# Patient Record
Sex: FEMALE | Race: BLACK OR AFRICAN AMERICAN | NOT HISPANIC OR LATINO | URBAN - METROPOLITAN AREA
[De-identification: names, ages, dates, MRNs, and addresses within clinical notes are randomized per-mention and may not be internally consistent; named-entity substitution may affect disease eponyms.]

---

## 2017-06-23 ENCOUNTER — EMERGENCY (EMERGENCY)
Facility: HOSPITAL | Age: 27
LOS: 0 days | Discharge: HOME | End: 2017-06-24
Attending: EMERGENCY MEDICINE
Payer: SELF-PAY

## 2017-06-23 VITALS
HEIGHT: 68 IN | DIASTOLIC BLOOD PRESSURE: 71 MMHG | OXYGEN SATURATION: 100 % | SYSTOLIC BLOOD PRESSURE: 118 MMHG | HEART RATE: 63 BPM | RESPIRATION RATE: 17 BRPM | TEMPERATURE: 98 F | WEIGHT: 179.9 LBS

## 2017-06-23 DIAGNOSIS — M79.671 PAIN IN RIGHT FOOT: ICD-10-CM

## 2017-06-23 DIAGNOSIS — M72.2 PLANTAR FASCIAL FIBROMATOSIS: ICD-10-CM

## 2017-06-23 PROCEDURE — 99283 EMERGENCY DEPT VISIT LOW MDM: CPT | Mod: 25

## 2017-06-23 PROCEDURE — 99053 MED SERV 10PM-8AM 24 HR FAC: CPT

## 2017-06-23 NOTE — ED ADULT TRIAGE NOTE - CHIEF COMPLAINT QUOTE
pt c/o R-foot pain since Sunday.  denies injury/trauma pt c/o R-foot pain since Sunday.  denies injury/trauma.  LMP 6/19/17

## 2017-06-24 VITALS
HEART RATE: 60 BPM | DIASTOLIC BLOOD PRESSURE: 78 MMHG | TEMPERATURE: 98 F | SYSTOLIC BLOOD PRESSURE: 111 MMHG | OXYGEN SATURATION: 98 % | RESPIRATION RATE: 16 BRPM

## 2017-06-24 PROCEDURE — 73630 X-RAY EXAM OF FOOT: CPT | Mod: 26,RT

## 2017-06-24 NOTE — ED PROVIDER NOTE - PHYSICAL EXAMINATION
GEN: Alert, NAD  HEAD: atraumatic, EOMI, PERRL, normal lids/conjunctiva  ENT: normal hearing, patent oropharynx without erythema/exudate, uvula midline  NECK: +supple, no tenderness/meningismus, +Trachea midline  PULM: Bilateral BS, normal resp effort, no wheeze/stridor/retractions  CV: RRR, no M/R/G, +dist ext pulses  ABD: soft, NT/ND  BACK: no CVAT, no midline tenderness  MSK: no edema/erythema/cyanosis  SKIN: no rash  NEURO: AAOx3, no sensory/motor deficits, CN 2-12 intact  R foot - mild tender along plantar fascia and dorsal metatarsal area near 2nd toe, no marked swelling, pain worse w flexio nof the toes. can dorsi/plantar flex foot

## 2017-06-24 NOTE — ED PROVIDER NOTE - PRESENTING SYMPTOMS DETAILS
26F no pmhx/shx coemsin w R foot pain .. started a few days ago while getting out of the car, does a lot of steps at home, no falls/injuries. pain at bottom of her ft worse w flexion and also midly on top, no kneepain  ROS: No fever/chills, No photophobia/eye pain/changes in vision, No ear pain/sore throat/dysphagia, No chest pain/palpitations, no SOB/cough/wheeze/stridor, No abdominal pain/N/V/D, no dysuria/frequency/discharge, No neck/back pain, no rash, no changes in neurological status/function.

## 2017-06-24 NOTE — ED PROVIDER NOTE - MEDICAL DECISION MAKING DETAILS
Xrays demonstrate no acute pathology. possible plantar fasciits, no signs of infx. pt appears well and non-toxic. . VSS. Sx have improved during ED stay. No acute events during ED observation period. Precautions given to return to the ED if sx persist or change. Pt expresses understanding and has no further questions. Pt feels comfortable and wishes to be discharged home. Instructed to follow up with PMD in 24 hrs. ortho

## 2018-12-10 PROBLEM — Z00.00 ENCOUNTER FOR PREVENTIVE HEALTH EXAMINATION: Status: ACTIVE | Noted: 2018-12-10

## 2018-12-21 ENCOUNTER — APPOINTMENT (OUTPATIENT)
Dept: OBGYN | Facility: CLINIC | Age: 28
End: 2018-12-21
Payer: COMMERCIAL

## 2018-12-21 ENCOUNTER — ASOB RESULT (OUTPATIENT)
Age: 28
End: 2018-12-21

## 2018-12-21 ENCOUNTER — OTHER (OUTPATIENT)
Age: 28
End: 2018-12-21

## 2018-12-21 VITALS
DIASTOLIC BLOOD PRESSURE: 75 MMHG | HEART RATE: 77 BPM | SYSTOLIC BLOOD PRESSURE: 120 MMHG | BODY MASS INDEX: 30.89 KG/M2 | WEIGHT: 203.8 LBS | HEIGHT: 68 IN

## 2018-12-21 DIAGNOSIS — Z87.42 PERSONAL HISTORY OF OTHER DISEASES OF THE FEMALE GENITAL TRACT: ICD-10-CM

## 2018-12-21 DIAGNOSIS — Z83.49 FAMILY HISTORY OF OTHER ENDOCRINE, NUTRITIONAL AND METABOLIC DISEASES: ICD-10-CM

## 2018-12-21 DIAGNOSIS — Z83.3 FAMILY HISTORY OF DIABETES MELLITUS: ICD-10-CM

## 2018-12-21 DIAGNOSIS — Z82.49 FAMILY HISTORY OF ISCHEMIC HEART DISEASE AND OTHER DISEASES OF THE CIRCULATORY SYSTEM: ICD-10-CM

## 2018-12-21 DIAGNOSIS — Z87.59 PERSONAL HISTORY OF OTHER COMPLICATIONS OF PREGNANCY, CHILDBIRTH AND THE PUERPERIUM: ICD-10-CM

## 2018-12-21 PROCEDURE — 0500F INITIAL PRENATAL CARE VISIT: CPT

## 2018-12-21 PROCEDURE — 76817 TRANSVAGINAL US OBSTETRIC: CPT

## 2018-12-21 PROCEDURE — 90688 IIV4 VACCINE SPLT 0.5 ML IM: CPT

## 2018-12-21 PROCEDURE — 90471 IMMUNIZATION ADMIN: CPT

## 2018-12-28 ENCOUNTER — APPOINTMENT (OUTPATIENT)
Dept: OBGYN | Facility: CLINIC | Age: 28
End: 2018-12-28

## 2018-12-31 ENCOUNTER — APPOINTMENT (OUTPATIENT)
Dept: ANTEPARTUM | Facility: CLINIC | Age: 28
End: 2018-12-31
Payer: COMMERCIAL

## 2018-12-31 ENCOUNTER — ASOB RESULT (OUTPATIENT)
Age: 28
End: 2018-12-31

## 2018-12-31 PROCEDURE — 76813 OB US NUCHAL MEAS 1 GEST: CPT

## 2018-12-31 PROCEDURE — 76801 OB US < 14 WKS SINGLE FETUS: CPT

## 2018-12-31 PROCEDURE — 36416 COLLJ CAPILLARY BLOOD SPEC: CPT

## 2019-01-10 ENCOUNTER — APPOINTMENT (OUTPATIENT)
Dept: OBGYN | Facility: CLINIC | Age: 29
End: 2019-01-10
Payer: COMMERCIAL

## 2019-01-10 ENCOUNTER — NON-APPOINTMENT (OUTPATIENT)
Age: 29
End: 2019-01-10

## 2019-01-10 VITALS
DIASTOLIC BLOOD PRESSURE: 76 MMHG | BODY MASS INDEX: 31.42 KG/M2 | HEIGHT: 68 IN | WEIGHT: 207.3 LBS | SYSTOLIC BLOOD PRESSURE: 120 MMHG

## 2019-01-10 PROBLEM — Z82.49 FAMILY HISTORY OF HYPERTENSION: Status: ACTIVE | Noted: 2019-01-10

## 2019-01-10 PROBLEM — Z83.3 FAMILY HISTORY OF DIABETES MELLITUS: Status: ACTIVE | Noted: 2019-01-10

## 2019-01-10 PROBLEM — Z83.49 FAMILY HISTORY OF HYPOTHYROIDISM: Status: ACTIVE | Noted: 2019-01-10

## 2019-01-10 PROBLEM — Z87.42 HISTORY OF FEMALE INFERTILITY: Status: RESOLVED | Noted: 2019-01-10 | Resolved: 2019-01-10

## 2019-01-10 PROBLEM — Z87.59 HISTORY OF ECTOPIC PREGNANCY: Status: RESOLVED | Noted: 2019-01-10 | Resolved: 2019-01-10

## 2019-01-10 LAB
ABO + RH PNL BLD: NORMAL
B19V IGG SER QL IA: 2.7 INDEX
B19V IGG+IGM SER-IMP: NORMAL
B19V IGG+IGM SER-IMP: POSITIVE
B19V IGM FLD-ACNC: 0.1 INDEX
B19V IGM SER-ACNC: NEGATIVE
BACTERIA UR CULT: NORMAL
BASOPHILS # BLD AUTO: 0.03 K/UL
BASOPHILS NFR BLD AUTO: 0.3 %
BLD GP AB SCN SERPL QL: NORMAL
C TRACH RRNA SPEC QL NAA+PROBE: NOT DETECTED
EOSINOPHIL # BLD AUTO: 0.21 K/UL
EOSINOPHIL NFR BLD AUTO: 2.3 %
HBV SURFACE AG SER QL: NONREACTIVE
HCT VFR BLD CALC: 38.3 %
HCV AB SER QL: NONREACTIVE
HCV S/CO RATIO: 0.22 S/CO
HGB A MFR BLD: 97.7 %
HGB A2 MFR BLD: 2.3 %
HGB BLD-MCNC: 12.9 G/DL
HGB FRACT BLD-IMP: NORMAL
HIV1+2 AB SPEC QL IA.RAPID: NONREACTIVE
IMM GRANULOCYTES NFR BLD AUTO: 0.2 %
LEAD BLD-MCNC: 1 UG/DL
LYMPHOCYTES # BLD AUTO: 2.77 K/UL
LYMPHOCYTES NFR BLD AUTO: 30.2 %
MAN DIFF?: NORMAL
MCHC RBC-ENTMCNC: 27.9 PG
MCHC RBC-ENTMCNC: 33.7 GM/DL
MCV RBC AUTO: 82.7 FL
MONOCYTES # BLD AUTO: 0.77 K/UL
MONOCYTES NFR BLD AUTO: 8.4 %
N GONORRHOEA RRNA SPEC QL NAA+PROBE: NOT DETECTED
NEUTROPHILS # BLD AUTO: 5.36 K/UL
NEUTROPHILS NFR BLD AUTO: 58.6 %
PLATELET # BLD AUTO: 289 K/UL
RBC # BLD: 4.63 M/UL
RBC # FLD: 14 %
RUBV IGG FLD-ACNC: 4 INDEX
RUBV IGG SER-IMP: POSITIVE
SOURCE AMPLIFICATION: NORMAL
T GONDII AB SER-IMP: NEGATIVE
T GONDII AB SER-IMP: NEGATIVE
T GONDII IGG SER QL: <3 IU/ML
T GONDII IGM SER QL: <3 AU/ML
T PALLIDUM AB SER QL IA: NEGATIVE
VZV AB TITR SER: POSITIVE
VZV IGG SER IF-ACNC: 1967 INDEX
WBC # FLD AUTO: 9.16 K/UL

## 2019-01-10 PROCEDURE — 0502F SUBSEQUENT PRENATAL CARE: CPT

## 2019-02-06 ENCOUNTER — LABORATORY RESULT (OUTPATIENT)
Age: 29
End: 2019-02-06

## 2019-02-07 ENCOUNTER — NON-APPOINTMENT (OUTPATIENT)
Age: 29
End: 2019-02-07

## 2019-02-07 ENCOUNTER — APPOINTMENT (OUTPATIENT)
Dept: OBGYN | Facility: CLINIC | Age: 29
End: 2019-02-07
Payer: COMMERCIAL

## 2019-02-07 VITALS
WEIGHT: 208 LBS | DIASTOLIC BLOOD PRESSURE: 77 MMHG | BODY MASS INDEX: 31.52 KG/M2 | HEIGHT: 68 IN | SYSTOLIC BLOOD PRESSURE: 117 MMHG

## 2019-02-07 PROCEDURE — 0502F SUBSEQUENT PRENATAL CARE: CPT

## 2019-02-20 ENCOUNTER — OTHER (OUTPATIENT)
Age: 29
End: 2019-02-20

## 2019-02-20 ENCOUNTER — ASOB RESULT (OUTPATIENT)
Age: 29
End: 2019-02-20

## 2019-02-20 ENCOUNTER — APPOINTMENT (OUTPATIENT)
Dept: ANTEPARTUM | Facility: CLINIC | Age: 29
End: 2019-02-20
Payer: COMMERCIAL

## 2019-02-20 PROCEDURE — 76811 OB US DETAILED SNGL FETUS: CPT

## 2019-02-22 ENCOUNTER — OTHER (OUTPATIENT)
Age: 29
End: 2019-02-22

## 2019-03-04 ENCOUNTER — OUTPATIENT (OUTPATIENT)
Dept: OUTPATIENT SERVICES | Age: 29
LOS: 1 days | Discharge: HOME | End: 2019-03-04

## 2019-03-05 ENCOUNTER — APPOINTMENT (OUTPATIENT)
Dept: PEDIATRIC CARDIOLOGY | Facility: CLINIC | Age: 29
End: 2019-03-05
Payer: COMMERCIAL

## 2019-03-05 PROCEDURE — 99241 OFFICE CONSULTATION NEW/ESTAB PATIENT 15 MIN: CPT | Mod: 25

## 2019-03-05 PROCEDURE — 76820 UMBILICAL ARTERY ECHO: CPT

## 2019-03-05 PROCEDURE — 76827 ECHO EXAM OF FETAL HEART: CPT

## 2019-03-05 PROCEDURE — 93325 DOPPLER ECHO COLOR FLOW MAPG: CPT | Mod: 59

## 2019-03-05 PROCEDURE — 76825 ECHO EXAM OF FETAL HEART: CPT

## 2019-03-07 ENCOUNTER — NON-APPOINTMENT (OUTPATIENT)
Age: 29
End: 2019-03-07

## 2019-03-07 ENCOUNTER — APPOINTMENT (OUTPATIENT)
Dept: OBGYN | Facility: CLINIC | Age: 29
End: 2019-03-07
Payer: COMMERCIAL

## 2019-03-07 VITALS
WEIGHT: 212 LBS | BODY MASS INDEX: 32.13 KG/M2 | HEIGHT: 68 IN | DIASTOLIC BLOOD PRESSURE: 80 MMHG | SYSTOLIC BLOOD PRESSURE: 115 MMHG

## 2019-03-07 PROCEDURE — 0502F SUBSEQUENT PRENATAL CARE: CPT

## 2019-04-04 ENCOUNTER — APPOINTMENT (OUTPATIENT)
Dept: OBGYN | Facility: CLINIC | Age: 29
End: 2019-04-04
Payer: COMMERCIAL

## 2019-04-04 ENCOUNTER — NON-APPOINTMENT (OUTPATIENT)
Age: 29
End: 2019-04-04

## 2019-04-04 VITALS
SYSTOLIC BLOOD PRESSURE: 114 MMHG | HEIGHT: 68 IN | BODY MASS INDEX: 33.34 KG/M2 | WEIGHT: 220 LBS | DIASTOLIC BLOOD PRESSURE: 78 MMHG

## 2019-04-04 PROCEDURE — 0502F SUBSEQUENT PRENATAL CARE: CPT

## 2019-04-05 LAB
BASOPHILS # BLD AUTO: 0.05 K/UL
BASOPHILS NFR BLD AUTO: 0.4 %
EOSINOPHIL # BLD AUTO: 0.13 K/UL
EOSINOPHIL NFR BLD AUTO: 1.1 %
GLUCOSE 1H P 50 G GLC PO SERPL-MCNC: 107 MG/DL
HCT VFR BLD CALC: 34.9 %
HGB BLD-MCNC: 11.3 G/DL
IMM GRANULOCYTES NFR BLD AUTO: 1 %
LYMPHOCYTES # BLD AUTO: 2.82 K/UL
LYMPHOCYTES NFR BLD AUTO: 24.4 %
MAN DIFF?: NORMAL
MCHC RBC-ENTMCNC: 28.4 PG
MCHC RBC-ENTMCNC: 32.4 GM/DL
MCV RBC AUTO: 87.7 FL
MONOCYTES # BLD AUTO: 1.04 K/UL
MONOCYTES NFR BLD AUTO: 9 %
NEUTROPHILS # BLD AUTO: 7.41 K/UL
NEUTROPHILS NFR BLD AUTO: 64.1 %
PLATELET # BLD AUTO: 302 K/UL
RBC # BLD: 3.98 M/UL
RBC # FLD: 14.8 %
WBC # FLD AUTO: 11.56 K/UL

## 2019-04-25 ENCOUNTER — APPOINTMENT (OUTPATIENT)
Dept: OBGYN | Facility: CLINIC | Age: 29
End: 2019-04-25
Payer: COMMERCIAL

## 2019-04-25 VITALS
DIASTOLIC BLOOD PRESSURE: 80 MMHG | SYSTOLIC BLOOD PRESSURE: 122 MMHG | BODY MASS INDEX: 32.74 KG/M2 | WEIGHT: 216 LBS | HEIGHT: 68 IN

## 2019-04-25 PROCEDURE — 0502F SUBSEQUENT PRENATAL CARE: CPT

## 2019-04-26 ENCOUNTER — NON-APPOINTMENT (OUTPATIENT)
Age: 29
End: 2019-04-26

## 2019-05-02 ENCOUNTER — APPOINTMENT (OUTPATIENT)
Age: 29
End: 2019-05-02

## 2019-05-02 ENCOUNTER — OUTPATIENT (OUTPATIENT)
Dept: INPATIENT UNIT | Facility: HOSPITAL | Age: 29
LOS: 1 days | Discharge: HOME | End: 2019-05-02

## 2019-05-02 ENCOUNTER — ASOB RESULT (OUTPATIENT)
Age: 29
End: 2019-05-02

## 2019-05-02 VITALS — DIASTOLIC BLOOD PRESSURE: 78 MMHG | HEART RATE: 88 BPM | SYSTOLIC BLOOD PRESSURE: 134 MMHG

## 2019-05-02 VITALS
HEART RATE: 88 BPM | DIASTOLIC BLOOD PRESSURE: 78 MMHG | RESPIRATION RATE: 18 BRPM | SYSTOLIC BLOOD PRESSURE: 134 MMHG | TEMPERATURE: 98 F

## 2019-05-02 DIAGNOSIS — Z98.890 OTHER SPECIFIED POSTPROCEDURAL STATES: Chronic | ICD-10-CM

## 2019-05-02 DIAGNOSIS — O26.899 OTHER SPECIFIED PREGNANCY RELATED CONDITIONS, UNSPECIFIED TRIMESTER: ICD-10-CM

## 2019-05-02 DIAGNOSIS — Z3A.00 WEEKS OF GESTATION OF PREGNANCY NOT SPECIFIED: ICD-10-CM

## 2019-05-02 DIAGNOSIS — Z90.79 ACQUIRED ABSENCE OF OTHER GENITAL ORGAN(S): Chronic | ICD-10-CM

## 2019-05-02 LAB
APPEARANCE UR: CLEAR — SIGNIFICANT CHANGE UP
BILIRUB UR-MCNC: NEGATIVE — SIGNIFICANT CHANGE UP
BLOOD UR QL VISUAL: NEGATIVE — SIGNIFICANT CHANGE UP
COLOR SPEC: YELLOW — SIGNIFICANT CHANGE UP
GLUCOSE UR-MCNC: NEGATIVE — SIGNIFICANT CHANGE UP
KETONES UR-MCNC: NEGATIVE — SIGNIFICANT CHANGE UP
LEUKOCYTE ESTERASE UR-ACNC: NEGATIVE — SIGNIFICANT CHANGE UP
NITRITE UR-MCNC: NEGATIVE — SIGNIFICANT CHANGE UP
PH UR: 6.5 — SIGNIFICANT CHANGE UP (ref 5–8)
PROT UR-MCNC: NEGATIVE — SIGNIFICANT CHANGE UP
SP GR SPEC: 1.01 — SIGNIFICANT CHANGE UP (ref 1–1.04)
UROBILINOGEN FLD QL: NORMAL — SIGNIFICANT CHANGE UP

## 2019-05-02 NOTE — OB PROVIDER TRIAGE NOTE - NSOBPROVIDERNOTE_OBGYN_ALL_OB_FT
27yo  @ 30.1 wks SLIUP uncomp PNC here co point tenderness 4 cm above umbillicus.  -NST cat I with accels and mod variability; no ctx's  -sono by AT sonographer reveals 8/8 BPP  -pt was dw Dr Maciel. Pt was dc home with instructions  -TVS-5.0-5.03 cm no evidence of PTL

## 2019-05-02 NOTE — OB RN TRIAGE NOTE - PMH
<<----- Click to add NO pertinent Past Medical History No pertinent past medical history Right tubal pregnancy without intrauterine pregnancy  rx with methotrexate

## 2019-05-02 NOTE — OB PROVIDER TRIAGE NOTE - PSH
H/O shoulder surgery  2012  R  H/O unilateral salpingectomy  2018 right  History of salpingectomy  2008 L ectopic

## 2019-05-02 NOTE — OB PROVIDER TRIAGE NOTE - HISTORY OF PRESENT ILLNESS
27yo  @ 30.1 wks SLIUP via IVF here co point pain few cm's above the umbilicus that would come/go every few minutes and started this morning. Pt reports pain with activities only, no pain now that she is laying down. Pt is intact with GFM. Pt reports the pain as non-radiating. No fever/chills/nausea/vomitting. Reports no issues urinating, no constipation, reports normal bowel habits. No trauma to area.

## 2019-05-09 ENCOUNTER — MED ADMIN CHARGE (OUTPATIENT)
Age: 29
End: 2019-05-09

## 2019-05-09 ENCOUNTER — APPOINTMENT (OUTPATIENT)
Dept: OBGYN | Facility: CLINIC | Age: 29
End: 2019-05-09
Payer: COMMERCIAL

## 2019-05-09 ENCOUNTER — NON-APPOINTMENT (OUTPATIENT)
Age: 29
End: 2019-05-09

## 2019-05-09 VITALS
BODY MASS INDEX: 33.65 KG/M2 | DIASTOLIC BLOOD PRESSURE: 80 MMHG | SYSTOLIC BLOOD PRESSURE: 125 MMHG | WEIGHT: 222 LBS | HEIGHT: 68 IN

## 2019-05-09 DIAGNOSIS — Z34.00 ENCOUNTER FOR SUPERVISION OF NORMAL FIRST PREGNANCY, UNSPECIFIED TRIMESTER: ICD-10-CM

## 2019-05-09 PROBLEM — O00.101 RIGHT TUBAL PREGNANCY WITHOUT INTRAUTERINE PREGNANCY: Chronic | Status: ACTIVE | Noted: 2019-05-02

## 2019-05-09 PROCEDURE — 0502F SUBSEQUENT PRENATAL CARE: CPT

## 2019-05-09 PROCEDURE — 90471 IMMUNIZATION ADMIN: CPT

## 2019-05-09 PROCEDURE — 90715 TDAP VACCINE 7 YRS/> IM: CPT

## 2019-05-15 ENCOUNTER — OTHER (OUTPATIENT)
Age: 29
End: 2019-05-15

## 2019-05-15 ENCOUNTER — APPOINTMENT (OUTPATIENT)
Dept: ANTEPARTUM | Facility: CLINIC | Age: 29
End: 2019-05-15
Payer: COMMERCIAL

## 2019-05-15 ENCOUNTER — ASOB RESULT (OUTPATIENT)
Age: 29
End: 2019-05-15

## 2019-05-15 PROCEDURE — 76819 FETAL BIOPHYS PROFIL W/O NST: CPT

## 2019-05-23 ENCOUNTER — APPOINTMENT (OUTPATIENT)
Dept: OBGYN | Facility: CLINIC | Age: 29
End: 2019-05-23

## 2019-05-23 ENCOUNTER — APPOINTMENT (OUTPATIENT)
Dept: OBGYN | Facility: CLINIC | Age: 29
End: 2019-05-23
Payer: COMMERCIAL

## 2019-05-23 ENCOUNTER — NON-APPOINTMENT (OUTPATIENT)
Age: 29
End: 2019-05-23

## 2019-05-23 VITALS
WEIGHT: 221 LBS | SYSTOLIC BLOOD PRESSURE: 127 MMHG | BODY MASS INDEX: 33.49 KG/M2 | DIASTOLIC BLOOD PRESSURE: 82 MMHG | HEIGHT: 68 IN

## 2019-05-23 PROCEDURE — 0502F SUBSEQUENT PRENATAL CARE: CPT

## 2019-05-23 RX ORDER — PRENATAL VIT/IRON FUM/FOLIC AC 27MG-0.8MG
TABLET ORAL
Refills: 0 | Status: ACTIVE | COMMUNITY

## 2019-05-24 ENCOUNTER — OTHER (OUTPATIENT)
Age: 29
End: 2019-05-24

## 2019-06-03 ENCOUNTER — OTHER (OUTPATIENT)
Age: 29
End: 2019-06-03

## 2019-06-03 LAB
GLUCOSE 1H P 50 G GLC PO SERPL-MCNC: 125 MG/DL
MEV IGG FLD QL IA: >300 AU/ML
MEV IGG+IGM SER-IMP: POSITIVE

## 2019-06-06 ENCOUNTER — APPOINTMENT (OUTPATIENT)
Dept: OBGYN | Facility: CLINIC | Age: 29
End: 2019-06-06

## 2019-06-06 ENCOUNTER — NON-APPOINTMENT (OUTPATIENT)
Age: 29
End: 2019-06-06

## 2019-06-06 ENCOUNTER — INPATIENT (INPATIENT)
Facility: HOSPITAL | Age: 29
LOS: 0 days | Discharge: HOME | End: 2019-06-07
Attending: OBSTETRICS & GYNECOLOGY | Admitting: OBSTETRICS & GYNECOLOGY

## 2019-06-06 VITALS — SYSTOLIC BLOOD PRESSURE: 146 MMHG | DIASTOLIC BLOOD PRESSURE: 89 MMHG

## 2019-06-06 VITALS
SYSTOLIC BLOOD PRESSURE: 144 MMHG | BODY MASS INDEX: 35.31 KG/M2 | DIASTOLIC BLOOD PRESSURE: 96 MMHG | HEIGHT: 68 IN | WEIGHT: 233 LBS

## 2019-06-06 VITALS
RESPIRATION RATE: 20 BRPM | TEMPERATURE: 98 F | HEART RATE: 72 BPM | DIASTOLIC BLOOD PRESSURE: 85 MMHG | SYSTOLIC BLOOD PRESSURE: 158 MMHG

## 2019-06-06 DIAGNOSIS — O13.3 GESTATIONAL [PREGNANCY-INDUCED] HYPERTENSION W/OUT SIGNIFICANT PROTEINURIA, THIRD TRIMESTER: ICD-10-CM

## 2019-06-06 DIAGNOSIS — O26.899 OTHER SPECIFIED PREGNANCY RELATED CONDITIONS, UNSPECIFIED TRIMESTER: ICD-10-CM

## 2019-06-06 DIAGNOSIS — Z90.79 ACQUIRED ABSENCE OF OTHER GENITAL ORGAN(S): Chronic | ICD-10-CM

## 2019-06-06 DIAGNOSIS — Z3A.00 WEEKS OF GESTATION OF PREGNANCY NOT SPECIFIED: ICD-10-CM

## 2019-06-06 DIAGNOSIS — Z98.890 OTHER SPECIFIED POSTPROCEDURAL STATES: Chronic | ICD-10-CM

## 2019-06-06 LAB
ALBUMIN SERPL ELPH-MCNC: 3.2 G/DL — LOW (ref 3.3–5)
ALP SERPL-CCNC: 156 U/L — HIGH (ref 40–120)
ALT FLD-CCNC: 11 U/L — SIGNIFICANT CHANGE UP (ref 4–33)
ANION GAP SERPL CALC-SCNC: 12 MMO/L — SIGNIFICANT CHANGE UP (ref 7–14)
APPEARANCE UR: CLEAR — SIGNIFICANT CHANGE UP
APTT BLD: 25.9 SEC — LOW (ref 27.5–36.3)
AST SERPL-CCNC: 24 U/L — SIGNIFICANT CHANGE UP (ref 4–32)
BACTERIA # UR AUTO: NEGATIVE — SIGNIFICANT CHANGE UP
BASOPHILS # BLD AUTO: 0.04 K/UL — SIGNIFICANT CHANGE UP (ref 0–0.2)
BASOPHILS NFR BLD AUTO: 0.5 % — SIGNIFICANT CHANGE UP (ref 0–2)
BILIRUB SERPL-MCNC: < 0.2 MG/DL — LOW (ref 0.2–1.2)
BILIRUB UR-MCNC: NEGATIVE — SIGNIFICANT CHANGE UP
BLOOD UR QL VISUAL: NEGATIVE — SIGNIFICANT CHANGE UP
BUN SERPL-MCNC: 10 MG/DL — SIGNIFICANT CHANGE UP (ref 7–23)
CALCIUM SERPL-MCNC: 9.4 MG/DL — SIGNIFICANT CHANGE UP (ref 8.4–10.5)
CHLORIDE SERPL-SCNC: 105 MMOL/L — SIGNIFICANT CHANGE UP (ref 98–107)
CO2 SERPL-SCNC: 23 MMOL/L — SIGNIFICANT CHANGE UP (ref 22–31)
COLOR SPEC: YELLOW — SIGNIFICANT CHANGE UP
CREAT ?TM UR-MCNC: 202.3 MG/DL — SIGNIFICANT CHANGE UP
CREAT SERPL-MCNC: 0.66 MG/DL — SIGNIFICANT CHANGE UP (ref 0.5–1.3)
EOSINOPHIL # BLD AUTO: 0.07 K/UL — SIGNIFICANT CHANGE UP (ref 0–0.5)
EOSINOPHIL NFR BLD AUTO: 0.8 % — SIGNIFICANT CHANGE UP (ref 0–6)
FIBRINOGEN PPP-MCNC: 665 MG/DL — HIGH (ref 350–510)
GLUCOSE SERPL-MCNC: 103 MG/DL — HIGH (ref 70–99)
GLUCOSE UR-MCNC: NEGATIVE — SIGNIFICANT CHANGE UP
HCT VFR BLD CALC: 33.9 % — LOW (ref 34.5–45)
HGB BLD-MCNC: 11.4 G/DL — LOW (ref 11.5–15.5)
HYALINE CASTS # UR AUTO: SIGNIFICANT CHANGE UP
IMM GRANULOCYTES NFR BLD AUTO: 0.4 % — SIGNIFICANT CHANGE UP (ref 0–1.5)
INR BLD: 0.9 — SIGNIFICANT CHANGE UP (ref 0.88–1.17)
KETONES UR-MCNC: NEGATIVE — SIGNIFICANT CHANGE UP
LDH SERPL L TO P-CCNC: 198 U/L — SIGNIFICANT CHANGE UP (ref 135–225)
LEUKOCYTE ESTERASE UR-ACNC: NEGATIVE — SIGNIFICANT CHANGE UP
LYMPHOCYTES # BLD AUTO: 2.42 K/UL — SIGNIFICANT CHANGE UP (ref 1–3.3)
LYMPHOCYTES # BLD AUTO: 29.4 % — SIGNIFICANT CHANGE UP (ref 13–44)
MCHC RBC-ENTMCNC: 28.9 PG — SIGNIFICANT CHANGE UP (ref 27–34)
MCHC RBC-ENTMCNC: 33.6 % — SIGNIFICANT CHANGE UP (ref 32–36)
MCV RBC AUTO: 86 FL — SIGNIFICANT CHANGE UP (ref 80–100)
MONOCYTES # BLD AUTO: 0.82 K/UL — SIGNIFICANT CHANGE UP (ref 0–0.9)
MONOCYTES NFR BLD AUTO: 10 % — SIGNIFICANT CHANGE UP (ref 2–14)
NEUTROPHILS # BLD AUTO: 4.86 K/UL — SIGNIFICANT CHANGE UP (ref 1.8–7.4)
NEUTROPHILS NFR BLD AUTO: 58.9 % — SIGNIFICANT CHANGE UP (ref 43–77)
NITRITE UR-MCNC: NEGATIVE — SIGNIFICANT CHANGE UP
NRBC # FLD: 0 K/UL — SIGNIFICANT CHANGE UP (ref 0–0)
PH UR: 6.5 — SIGNIFICANT CHANGE UP (ref 5–8)
PLATELET # BLD AUTO: 232 K/UL — SIGNIFICANT CHANGE UP (ref 150–400)
PMV BLD: 10.9 FL — SIGNIFICANT CHANGE UP (ref 7–13)
POTASSIUM SERPL-MCNC: 4 MMOL/L — SIGNIFICANT CHANGE UP (ref 3.5–5.3)
POTASSIUM SERPL-SCNC: 4 MMOL/L — SIGNIFICANT CHANGE UP (ref 3.5–5.3)
PROT SERPL-MCNC: 6.3 G/DL — SIGNIFICANT CHANGE UP (ref 6–8.3)
PROT UR-MCNC: 49.2 MG/DL — SIGNIFICANT CHANGE UP
PROT UR-MCNC: 50 — SIGNIFICANT CHANGE UP
PROTHROM AB SERPL-ACNC: 10 SEC — SIGNIFICANT CHANGE UP (ref 9.8–13.1)
RBC # BLD: 3.94 M/UL — SIGNIFICANT CHANGE UP (ref 3.8–5.2)
RBC # FLD: 14.5 % — SIGNIFICANT CHANGE UP (ref 10.3–14.5)
RBC CASTS # UR COMP ASSIST: SIGNIFICANT CHANGE UP (ref 0–?)
SODIUM SERPL-SCNC: 140 MMOL/L — SIGNIFICANT CHANGE UP (ref 135–145)
SP GR SPEC: 1.03 — SIGNIFICANT CHANGE UP (ref 1–1.04)
SQUAMOUS # UR AUTO: SIGNIFICANT CHANGE UP
URATE SERPL-MCNC: 4.9 MG/DL — SIGNIFICANT CHANGE UP (ref 2.5–7)
UROBILINOGEN FLD QL: NORMAL — SIGNIFICANT CHANGE UP
WBC # BLD: 8.24 K/UL — SIGNIFICANT CHANGE UP (ref 3.8–10.5)
WBC # FLD AUTO: 8.24 K/UL — SIGNIFICANT CHANGE UP (ref 3.8–10.5)
WBC UR QL: HIGH (ref 0–?)

## 2019-06-06 NOTE — OB PROVIDER TRIAGE NOTE - NSOBPROVIDERNOTE_OBGYN_ALL_OB_FT
29 yo  @35.1 presents from Dr Santiago office with /96 144/89 of and +2 pitting edema.  denies ha, visual disturbances or epigastric pain  pt reports 13lb weight gain over 2 wks  denies n/v/d  denies cx, vb, lof  reports + fetal movement  IVF preg, otherwise antepartum uncomplicated    Abd Soft NT on palpation  T(C): 36.7 (19 @ 15:28), Max: 36.7 (19 @ 15:28)  HR: 69 (19 @ 18:52) (61 - 74)  BP: 138/84 (19 @ 18:42) (138/84 - 158/85)  RR: 20 (19 @ 15:28) (20 - 20)  SpO2: --  NST in progress  PEC pending  BP monitoring  will continue to monitor 29 yo  @35.1 presents from Dr Santiago office with /96 144/89 of and +2 pitting edema.  denies ha, visual disturbances or epigastric pain  pt reports 13lb weight gain over 2 wks  denies n/v/d  denies cx, vb, lof  reports + fetal movement  IVF preg, otherwise antepartum uncomplicated    Abd Soft NT on palpation  T(C): 36.7 (19 @ 15:28), Max: 36.7 (19 @ 15:28)  HR: 69 (19 @ 18:52) (61 - 74)  BP: 138/84 (19 @ 18:42) (138/84 - 158/85)  RR: 20 (19 @ 15:28) (20 - 20)  SpO2: --  NST in progress  PEC pending  BP monitoring  will continue to monitor    @1920 received report from MILTON Cardoza NP.   [facility] labs  : 29 yo  @35.1 presents from Dr Santiago office with /96 144/89 of and +2 pitting edema.  denies ha, visual disturbances or epigastric pain  pt reports 13lb weight gain over 2 wks  denies n/v/d  denies cx, vb, lof  reports + fetal movement  IVF preg, otherwise antepartum uncomplicated    Abd Soft NT on palpation  T(C): 36.7 (19 @ 15:28), Max: 36.7 (19 @ 15:28)  HR: 69 (19 @ 18:52) (61 - 74)  BP: 138/84 (19 @ 18:42) (138/84 - 158/85)  RR: 20 (19 @ 15:28) (20 - 20)  SpO2: --  NST in progress  PEC pending  BP monitoring  will continue to monitor    @1920 received report from MILTON Cardoza NP.   BP:143/72, 144/78, 138/84, 150/87, 144/87, 147/86, 132/78, 132/67  HELLP labs  CBC: H&H:11.4/33.9, Platelet: 232  Coagulation: PT:10, INR:0.9, PTT:25.9  Fibrinogen:665  CMP: BUN:10, Creatinine:0.66, AST:24, ALT:11  Uric Acid: 4.9  LDH: 198  P:C Ratio: 0.24    Discussed findings with Dr. Adams. Pt. admitted for blood monitoring and 24hr urine collection  -NST BID  -GBS culture collected

## 2019-06-06 NOTE — OB PROVIDER TRIAGE NOTE - NSHPLABSRESULTS_GEN_ALL_CORE
PEC labs PEC labs  CBC: H&H:11.4/33.9, Platelet: 232  Coagulation: PT:10, INR:0.9, PTT:25.9  Fibrinogen:665  CMP: BUN:10, Creatinine:0.66, AST:24, ALT:11  Uric Acid: 4.9  LDH: 198  P:C Ratio: 0.24

## 2019-06-06 NOTE — OB PROVIDER TRIAGE NOTE - NS_OBGYNHISTORY_OBGYN_ALL_OB_FT
2008 L ectopic preg left salpingectomy  2012 ectopic Methotrexate  2018 right salpingectomy for fluid on ultrasound before ivf  IVF this pregnancy

## 2019-06-06 NOTE — OB PROVIDER H&P - NSHPLABSRESULTS_GEN_ALL_CORE
PEC labs  CBC: H&H:11.4/33.9, Platelet: 232  Coagulation: PT:10, INR:0.9, PTT:25.9  Fibrinogen:665  CMP: BUN:10, Creatinine:0.66, AST:24, ALT:11  Uric Acid: 4.9  LDH: 198  P:C Ratio: 0.24

## 2019-06-06 NOTE — OB RN PATIENT PROFILE - NS_NUMBOFVISITS_OBGYN_ALL_OB_NU
January 20, 2018      To Whom It May Concern:      Yamila Wright was seen in our Emergency Department today, 01/20/18.  I expect her condition to improve over the next 2 days.  She may return to work when improved.    Sincerely,        Abhinav Macdonald MD        
5

## 2019-06-06 NOTE — OB PROVIDER H&P - ASSESSMENT
27 yo  @35.1 presents from Dr Santiago office with /96 144/89 of and +2 pitting edema.  denies ha, visual disturbances or epigastric pain  pt reports 13lb weight gain over 2 wks  denies n/v/d  denies cx, vb, lof  reports + fetal movement  IVF preg, otherwise antepartum uncomplicated    Abd Soft NT on palpation  T(C): 36.7 (19 @ 15:28), Max: 36.7 (19 @ 15:28)  HR: 69 (19 @ 18:52) (61 - 74)  BP: 138/84 (19 @ 18:42) (138/84 - 158/85)  RR: 20 (19 @ 15:28) (20 - 20)  SpO2: --  NST in progress  PEC pending  BP monitoring  will continue to monitor    @1920 received report from MILTON Cardoza NP.   BP:143/72, 144/78, 138/84, 150/87, 144/87, 147/86, 132/78, 132/67  HELLP labs  CBC: H&H:11.4/33.9, Platelet: 232  Coagulation: PT:10, INR:0.9, PTT:25.9  Fibrinogen:665  CMP: BUN:10, Creatinine:0.66, AST:24, ALT:11  Uric Acid: 4.9  LDH: 198  P:C Ratio: 0.24    Discussed findings with Dr. Adams. Pt. admitted for blood monitoring and 24hr urine collection  -NST BID  -GBS culture collected

## 2019-06-07 ENCOUNTER — APPOINTMENT (OUTPATIENT)
Dept: ANTEPARTUM | Facility: HOSPITAL | Age: 29
End: 2019-06-07
Payer: COMMERCIAL

## 2019-06-07 ENCOUNTER — TRANSCRIPTION ENCOUNTER (OUTPATIENT)
Age: 29
End: 2019-06-07

## 2019-06-07 ENCOUNTER — OUTPATIENT (OUTPATIENT)
Dept: OUTPATIENT SERVICES | Facility: HOSPITAL | Age: 29
LOS: 1 days | End: 2019-06-07

## 2019-06-07 ENCOUNTER — ASOB RESULT (OUTPATIENT)
Age: 29
End: 2019-06-07

## 2019-06-07 VITALS
TEMPERATURE: 97 F | DIASTOLIC BLOOD PRESSURE: 81 MMHG | HEART RATE: 73 BPM | RESPIRATION RATE: 18 BRPM | OXYGEN SATURATION: 94 % | SYSTOLIC BLOOD PRESSURE: 141 MMHG

## 2019-06-07 DIAGNOSIS — Z90.79 ACQUIRED ABSENCE OF OTHER GENITAL ORGAN(S): Chronic | ICD-10-CM

## 2019-06-07 DIAGNOSIS — O13.9 GESTATIONAL [PREGNANCY-INDUCED] HYPERTENSION WITHOUT SIGNIFICANT PROTEINURIA, UNSPECIFIED TRIMESTER: ICD-10-CM

## 2019-06-07 DIAGNOSIS — Z98.890 OTHER SPECIFIED POSTPROCEDURAL STATES: Chronic | ICD-10-CM

## 2019-06-07 DIAGNOSIS — O16.9 UNSPECIFIED MATERNAL HYPERTENSION, UNSPECIFIED TRIMESTER: ICD-10-CM

## 2019-06-07 LAB
BLD GP AB SCN SERPL QL: NEGATIVE — SIGNIFICANT CHANGE UP
RH IG SCN BLD-IMP: POSITIVE — SIGNIFICANT CHANGE UP
T PALLIDUM AB TITR SER: NEGATIVE — SIGNIFICANT CHANGE UP

## 2019-06-07 PROCEDURE — 76819 FETAL BIOPHYS PROFIL W/O NST: CPT | Mod: 26

## 2019-06-07 PROCEDURE — 76805 OB US >/= 14 WKS SNGL FETUS: CPT | Mod: 26

## 2019-06-07 NOTE — DISCHARGE NOTE ANTEPARTUM - HOSPITAL COURSE
29y/o  at 35.2 weeks admitted with mildly elevated blood pressures and lower extremity edema. HELLP labs WNL; p/c ratio of 0.24. Pt collecting 24 hour protein. Pt denies headaches, visual changes, RUQ pain, N/V. Pt denies contx, VB, LOF. Endorses good FM. ATU sono : cephalic/ anterior placenta/ AFI___/ BPP . EFW____. Patient is stable and ready to be discharged to home with home BP monitoring and close outpatient follow up with OB early next week. 29y/o  at 35.2 weeks admitted with mildly elevated blood pressures and lower extremity edema. HELLP labs WNL; p/c ratio of 0.24. Pt collecting 24 hour protein. Pt denies headaches, visual changes, RUQ pain, N/V. Pt denies contx, VB, LOF. Endorses good FM. ATU sono : cephalic/ anterior placenta/ MARIAA WNL/ BPP . EFW 3351grams. Patient is stable and ready to be discharged to home with home BP monitoring and close outpatient follow up with OB early next week and ATU for testing on  at 10 am. Return with HA, visual changes, RUQ pain, N/V.

## 2019-06-07 NOTE — DISCHARGE NOTE ANTEPARTUM - PLAN OF CARE
Continue prenatal care remain normotensive - Monitor Blood pressure at home; if elevated >140/90s call MD/return to hospital  - Return to hospital with headaches, visual changes, RUQ pain, N/V  - Follow up with OB Monday/Tuesday of next week - please bring 24 hour protein collection to lab in AM  - Monitor Blood pressure at home; if elevated >140/90s call MD/return to hospital  - Return to hospital with headaches, visual changes, RUQ pain, N/V  - Follow up with OB Monday  - testing twice weekly until delivery at 37wks (ATU apt scheduled for  at 10am)

## 2019-06-07 NOTE — DISCHARGE NOTE ANTEPARTUM - MEDICATION SUMMARY - MEDICATIONS TO TAKE
I will START or STAY ON the medications listed below when I get home from the hospital:    Prenatal Multivitamins with Folic Acid 1 mg oral tablet  -- 1 tab(s) by mouth once a day  -- Indication: For pregnancy

## 2019-06-07 NOTE — DISCHARGE NOTE ANTEPARTUM - CARE PLAN
Principal Discharge DX:	Gestational hypertension  Goal:	Continue prenatal care remain normotensive  Assessment and plan of treatment:	- Monitor Blood pressure at home; if elevated >140/90s call MD/return to hospital  - Return to hospital with headaches, visual changes, RUQ pain, N/V  - Follow up with OB Monday/Tuesday of next week Principal Discharge DX:	Gestational hypertension  Goal:	Continue prenatal care remain normotensive  Assessment and plan of treatment:	- please bring 24 hour protein collection to lab in AM  - Monitor Blood pressure at home; if elevated >140/90s call MD/return to hospital  - Return to hospital with headaches, visual changes, RUQ pain, N/V  - Follow up with OB Monday  - testing twice weekly until delivery at 37wks (ATU apt scheduled for  at 10am)

## 2019-06-07 NOTE — PROGRESS NOTE ADULT - ATTENDING COMMENTS
saw patient with MFM and resident and agree for discharge with 24 hour urine to be completed at home and BP check on Monday and appointment on Thursday with me unless develops symptoms

## 2019-06-07 NOTE — DISCHARGE NOTE ANTEPARTUM - CARE PROVIDER_API CALL
Zuleima Elder (DO)  Obstetrics and Gynecology  [address] [facility] [facility], [address] [place] [address]  Phone: [phone]  Fax: [phone]  Follow Up Time:

## 2019-06-07 NOTE — DISCHARGE NOTE ANTEPARTUM - PATIENT PORTAL LINK FT
You can access the [facility] Patient Portal, offered by [facility], by registering with the following website: [email or web address]

## 2019-06-07 NOTE — PROGRESS NOTE ADULT - ASSESSMENT
27 y/o P0@35.2wks admitted for monitoring of elevated BPs, meeting criteria for gHTN. No lab abnormalities at this time. Pt asymptomatic.

## 2019-06-07 NOTE — PROGRESS NOTE ADULT - PROBLEM SELECTOR PLAN 1
-c/w BP monitoring  -c/w 24 urine collection  -IOL at 37th week gestation unless severe features  -biweekly fetal monitoring outpt    Roe, pgy3

## 2019-06-07 NOTE — PROGRESS NOTE ADULT - SUBJECTIVE AND OBJECTIVE BOX
R3 Antepartum Note, HD#2    Subjective:   Pt seen and evaluated at bedside. She denies any HA, visual changes, RUQ pain. She denies any SOB, CP, n/v, fever/chills. She denies any LOF, VB, ctx. +FM.    Objective:  T(F): 97.1 (19 @ 05:19), Max: 98.2 (19 @ 23:51)  HR: 73 (19 @ 05:19) (61 - 86)  BP: 141/81 (19 @ 05:19) (126/75 - 158/85)  RR: 18 (19 @ 05:19) (18 - 20)  SpO2: 94% (19 @ 05:19) (88% - 95%)    MEDICATIONS  (STANDING):  prenatal multivitamin 1 Tablet(s) Oral daily    MEDICATIONS  (PRN):      Physical Exam:  Constitutional: NAD, A+O x3  CV: RRR  Lungs: clear to auscultation bilaterally  Abdomen: soft, nondistended, no guarding, no rebound  Extremities: no lower extremity edema or calf tenderness bilaterally    LABS:        140    |  105    |  10     ----------------------------<  103<H>  4.0     |  23     |  0.66     Ca    9.4        2019 16:04    TPro  6.3    /  Alb  3.2<L>  /  TBili  < 0.2<L>  /  DBili  x      /  AST  24     /  ALT  11     /  AlkPhos  156<H>          PT/INR - ( 2019 16:04 )   PT: 10.0 SEC;   INR: 0.90          PTT - ( 2019 16:04 )  PTT:25.9 SEC  Urinalysis Basic - ( 2019 16:04 )    Color: YELLOW / Appearance: CLEAR / S.031 / pH: 6.5  Gluc: NEGATIVE / Ketone: NEGATIVE  / Bili: NEGATIVE / Urobili: NORMAL   Blood: NEGATIVE / Protein: 50 / Nitrite: NEGATIVE   Leuk Esterase: NEGATIVE / RBC: 3-5 / WBC 6-10   Sq Epi: FEW / Non Sq Epi: x / Bacteria: NEGATIVE

## 2019-06-08 ENCOUNTER — INPATIENT (INPATIENT)
Facility: HOSPITAL | Age: 29
LOS: 5 days | Discharge: HOME | End: 2019-06-14
Attending: OBSTETRICS & GYNECOLOGY | Admitting: OBSTETRICS & GYNECOLOGY
Payer: COMMERCIAL

## 2019-06-08 VITALS
DIASTOLIC BLOOD PRESSURE: 94 MMHG | TEMPERATURE: 98 F | RESPIRATION RATE: 16 BRPM | HEART RATE: 84 BPM | SYSTOLIC BLOOD PRESSURE: 134 MMHG

## 2019-06-08 DIAGNOSIS — O13.3 GESTATIONAL [PREGNANCY-INDUCED] HYPERTENSION WITHOUT SIGNIFICANT PROTEINURIA, THIRD TRIMESTER: ICD-10-CM

## 2019-06-08 DIAGNOSIS — O26.899 OTHER SPECIFIED PREGNANCY RELATED CONDITIONS, UNSPECIFIED TRIMESTER: ICD-10-CM

## 2019-06-08 DIAGNOSIS — Z90.79 ACQUIRED ABSENCE OF OTHER GENITAL ORGAN(S): Chronic | ICD-10-CM

## 2019-06-08 DIAGNOSIS — Z98.890 OTHER SPECIFIED POSTPROCEDURAL STATES: Chronic | ICD-10-CM

## 2019-06-08 DIAGNOSIS — Z3A.00 WEEKS OF GESTATION OF PREGNANCY NOT SPECIFIED: ICD-10-CM

## 2019-06-08 LAB
ALBUMIN SERPL ELPH-MCNC: 3.2 G/DL — LOW (ref 3.3–5)
ALP SERPL-CCNC: 160 U/L — HIGH (ref 40–120)
ALT FLD-CCNC: 16 U/L — SIGNIFICANT CHANGE UP (ref 4–33)
ANION GAP SERPL CALC-SCNC: 14 MMO/L — SIGNIFICANT CHANGE UP (ref 7–14)
APPEARANCE UR: CLEAR — SIGNIFICANT CHANGE UP
APTT BLD: 24.3 SEC — LOW (ref 27.5–36.3)
AST SERPL-CCNC: 27 U/L — SIGNIFICANT CHANGE UP (ref 4–32)
BACTERIA # UR AUTO: SIGNIFICANT CHANGE UP
BASOPHILS # BLD AUTO: 0.04 K/UL — SIGNIFICANT CHANGE UP (ref 0–0.2)
BASOPHILS NFR BLD AUTO: 0.4 % — SIGNIFICANT CHANGE UP (ref 0–2)
BILIRUB SERPL-MCNC: 0.2 MG/DL — SIGNIFICANT CHANGE UP (ref 0.2–1.2)
BILIRUB UR-MCNC: NEGATIVE — SIGNIFICANT CHANGE UP
BLD GP AB SCN SERPL QL: NEGATIVE — SIGNIFICANT CHANGE UP
BLOOD UR QL VISUAL: NEGATIVE — SIGNIFICANT CHANGE UP
BUN SERPL-MCNC: 11 MG/DL — SIGNIFICANT CHANGE UP (ref 7–23)
CALCIUM SERPL-MCNC: 8.8 MG/DL — SIGNIFICANT CHANGE UP (ref 8.4–10.5)
CHLORIDE SERPL-SCNC: 104 MMOL/L — SIGNIFICANT CHANGE UP (ref 98–107)
CO2 SERPL-SCNC: 20 MMOL/L — LOW (ref 22–31)
COLOR SPEC: YELLOW — SIGNIFICANT CHANGE UP
CREAT ?TM UR-MCNC: 168.3 MG/DL — SIGNIFICANT CHANGE UP
CREAT SERPL-MCNC: 0.65 MG/DL — SIGNIFICANT CHANGE UP (ref 0.5–1.3)
EOSINOPHIL # BLD AUTO: 0.09 K/UL — SIGNIFICANT CHANGE UP (ref 0–0.5)
EOSINOPHIL NFR BLD AUTO: 1 % — SIGNIFICANT CHANGE UP (ref 0–6)
FIBRINOGEN PPP-MCNC: 663.4 MG/DL — HIGH (ref 350–510)
GLUCOSE SERPL-MCNC: 114 MG/DL — HIGH (ref 70–99)
GLUCOSE UR-MCNC: NEGATIVE — SIGNIFICANT CHANGE UP
HCT VFR BLD CALC: 34.8 % — SIGNIFICANT CHANGE UP (ref 34.5–45)
HGB BLD-MCNC: 11.7 G/DL — SIGNIFICANT CHANGE UP (ref 11.5–15.5)
HYALINE CASTS # UR AUTO: SIGNIFICANT CHANGE UP
IMM GRANULOCYTES NFR BLD AUTO: 0.4 % — SIGNIFICANT CHANGE UP (ref 0–1.5)
INR BLD: 0.92 — SIGNIFICANT CHANGE UP (ref 0.88–1.17)
KETONES UR-MCNC: NEGATIVE — SIGNIFICANT CHANGE UP
LDH SERPL L TO P-CCNC: 264 U/L — HIGH (ref 135–225)
LEUKOCYTE ESTERASE UR-ACNC: NEGATIVE — SIGNIFICANT CHANGE UP
LYMPHOCYTES # BLD AUTO: 2.36 K/UL — SIGNIFICANT CHANGE UP (ref 1–3.3)
LYMPHOCYTES # BLD AUTO: 26.2 % — SIGNIFICANT CHANGE UP (ref 13–44)
MAGNESIUM SERPL-MCNC: 4.3 MG/DL — HIGH (ref 1.6–2.6)
MCHC RBC-ENTMCNC: 28.5 PG — SIGNIFICANT CHANGE UP (ref 27–34)
MCHC RBC-ENTMCNC: 33.6 % — SIGNIFICANT CHANGE UP (ref 32–36)
MCV RBC AUTO: 84.9 FL — SIGNIFICANT CHANGE UP (ref 80–100)
MONOCYTES # BLD AUTO: 0.74 K/UL — SIGNIFICANT CHANGE UP (ref 0–0.9)
MONOCYTES NFR BLD AUTO: 8.2 % — SIGNIFICANT CHANGE UP (ref 2–14)
NEUTROPHILS # BLD AUTO: 5.73 K/UL — SIGNIFICANT CHANGE UP (ref 1.8–7.4)
NEUTROPHILS NFR BLD AUTO: 63.8 % — SIGNIFICANT CHANGE UP (ref 43–77)
NITRITE UR-MCNC: NEGATIVE — SIGNIFICANT CHANGE UP
NRBC # FLD: 0.02 K/UL — SIGNIFICANT CHANGE UP (ref 0–0)
PH UR: 6 — SIGNIFICANT CHANGE UP (ref 5–8)
PLATELET # BLD AUTO: 237 K/UL — SIGNIFICANT CHANGE UP (ref 150–400)
PMV BLD: 10.5 FL — SIGNIFICANT CHANGE UP (ref 7–13)
POTASSIUM SERPL-MCNC: 3.9 MMOL/L — SIGNIFICANT CHANGE UP (ref 3.5–5.3)
POTASSIUM SERPL-SCNC: 3.9 MMOL/L — SIGNIFICANT CHANGE UP (ref 3.5–5.3)
PROT SERPL-MCNC: 6.7 G/DL — SIGNIFICANT CHANGE UP (ref 6–8.3)
PROT UR-MCNC: 30 — SIGNIFICANT CHANGE UP
PROT UR-MCNC: 47.5 MG/DL — SIGNIFICANT CHANGE UP
PROTHROM AB SERPL-ACNC: 10.2 SEC — SIGNIFICANT CHANGE UP (ref 9.8–13.1)
RBC # BLD: 4.1 M/UL — SIGNIFICANT CHANGE UP (ref 3.8–5.2)
RBC # FLD: 14.3 % — SIGNIFICANT CHANGE UP (ref 10.3–14.5)
RBC CASTS # UR COMP ASSIST: SIGNIFICANT CHANGE UP (ref 0–?)
RH IG SCN BLD-IMP: POSITIVE — SIGNIFICANT CHANGE UP
SODIUM SERPL-SCNC: 138 MMOL/L — SIGNIFICANT CHANGE UP (ref 135–145)
SP GR SPEC: 1.02 — SIGNIFICANT CHANGE UP (ref 1–1.04)
SPECIMEN SOURCE: SIGNIFICANT CHANGE UP
SQUAMOUS # UR AUTO: SIGNIFICANT CHANGE UP
URATE SERPL-MCNC: 4.9 MG/DL — SIGNIFICANT CHANGE UP (ref 2.5–7)
UROBILINOGEN FLD QL: NORMAL — SIGNIFICANT CHANGE UP
WBC # BLD: 9 K/UL — SIGNIFICANT CHANGE UP (ref 3.8–10.5)
WBC # FLD AUTO: 9 K/UL — SIGNIFICANT CHANGE UP (ref 3.8–10.5)
WBC UR QL: SIGNIFICANT CHANGE UP (ref 0–?)

## 2019-06-08 RX ORDER — AMPICILLIN TRIHYDRATE 250 MG
2 CAPSULE ORAL ONCE
Refills: 0 | Status: COMPLETED | OUTPATIENT
Start: 2019-06-08 | End: 2019-06-08

## 2019-06-08 RX ORDER — SODIUM CHLORIDE 9 MG/ML
1000 INJECTION, SOLUTION INTRAVENOUS
Refills: 0 | Status: DISCONTINUED | OUTPATIENT
Start: 2019-06-08 | End: 2019-06-12

## 2019-06-08 RX ORDER — AMPICILLIN TRIHYDRATE 250 MG
1 CAPSULE ORAL EVERY 4 HOURS
Refills: 0 | Status: DISCONTINUED | OUTPATIENT
Start: 2019-06-08 | End: 2019-06-09

## 2019-06-08 RX ORDER — MAGNESIUM SULFATE 500 MG/ML
4 VIAL (ML) INJECTION ONCE
Refills: 0 | Status: COMPLETED | OUTPATIENT
Start: 2019-06-08 | End: 2019-06-08

## 2019-06-08 RX ORDER — MAGNESIUM SULFATE 500 MG/ML
2 VIAL (ML) INJECTION
Qty: 40 | Refills: 0 | Status: DISCONTINUED | OUTPATIENT
Start: 2019-06-08 | End: 2019-06-12

## 2019-06-08 RX ORDER — LABETALOL HCL 100 MG
20 TABLET ORAL ONCE
Refills: 0 | Status: COMPLETED | OUTPATIENT
Start: 2019-06-08 | End: 2019-06-08

## 2019-06-08 RX ORDER — SODIUM CHLORIDE 9 MG/ML
3 INJECTION INTRAMUSCULAR; INTRAVENOUS; SUBCUTANEOUS EVERY 8 HOURS
Refills: 0 | Status: DISCONTINUED | OUTPATIENT
Start: 2019-06-08 | End: 2019-06-12

## 2019-06-08 RX ADMIN — Medication 108 GRAM(S): at 23:40

## 2019-06-08 RX ADMIN — SODIUM CHLORIDE 50 MILLILITER(S): 9 INJECTION, SOLUTION INTRAVENOUS at 17:01

## 2019-06-08 RX ADMIN — Medication 50 GM/HR: at 17:21

## 2019-06-08 RX ADMIN — Medication 216 GRAM(S): at 19:40

## 2019-06-08 RX ADMIN — Medication 50 GM/HR: at 19:17

## 2019-06-08 RX ADMIN — Medication 300 GRAM(S): at 17:00

## 2019-06-08 RX ADMIN — Medication 20 MILLIGRAM(S): at 15:57

## 2019-06-08 RX ADMIN — Medication 12 MILLIGRAM(S): at 16:20

## 2019-06-08 NOTE — OB PROVIDER TRIAGE NOTE - NS_OBGYNHISTORY_OBGYN_ALL_OB_FT
GYN hx  2008 L ectopic preg left salpingectomy  2012 ectopic Methotrexate  2018 right salpingectomy for fluid on ultrasound before ivf  IVF this pregnancy

## 2019-06-08 NOTE — OB PROVIDER IHI INDUCTION/AUGMENTATION NOTE - NS_IHIMETHOD_OBGYN_ALL_OB
Cytotec PO Cytotec Vaginal/Maguire, Cervical/Cytotec PO Cytotec PO/Pitocin/Cytotec Vaginal/Maguire, Cervical

## 2019-06-08 NOTE — OB PROVIDER H&P - ATTENDING COMMENTS
Gestational HTN with severe BP range at 35wks for admission.  VE L/C/P  Start Magnesium sulfate  Induction of labor with cytotec; reevaluate for possible cervical balloon  s/p  steroids  consider PO labetalol

## 2019-06-08 NOTE — OB PROVIDER TRIAGE NOTE - NSOBPROVIDERNOTE_OBGYN_ALL_OB_FT
29 yo  @35.1 presents from home stating BP at home monitor /100 and stated that she was seent in triage on  after had documented elevated BP at the office of  /96 144/89 of and +2 pitting edema. pt stated was admitted on  and discharged on  after being monitored for BP and also did 24 U collection and  was told to monitor BP at home  pt stated that she had Headache last pm which resolved  stated not on any HTN meds and denied any hx prior to  office visit   denies ha, visual disturbances or epigastric pain on arrival today to triage   pt reports 13lb weight gain over 2 wks  denies n/v/d  denies cx, vb, lof  reports + fetal movement  IVF preg, otherwise antepartum uncomplicated

## 2019-06-08 NOTE — OB PROVIDER H&P - NSHPLABSRESULTS_GEN_ALL_CORE
[facility]    UA  P/C ratio HELLP LABS    UA  P/C ratio    CBC Full  -  ( 2019 13:35 )  WBC Count : 9.00 K/uL  RBC Count : 4.10 M/uL  Hemoglobin : 11.7 g/dL  Hematocrit : 34.8 %  Platelet Count - Automated : 237 K/uL  Mean Cell Volume : 84.9 fL  Mean Cell Hemoglobin : 28.5 pg  Mean Cell Hemoglobin Concentration : 33.6 %  Auto Neutrophil # : 5.73 K/uL  Auto Lymphocyte # : 2.36 K/uL  Auto Monocyte # : 0.74 K/uL  Auto Eosinophil # : 0.09 K/uL  Auto Basophil # : 0.04 K/uL  Auto Neutrophil % : 63.8 %  Auto Lymphocyte % : 26.2 %  Auto Monocyte % : 8.2 %  Auto Eosinophil % : 1.0 %  Auto Basophil % : 0.4 %      138  |  104  |  11  ----------------------------<  114<H>  3.9   |  20<L>  |  0.65    Ca    8.8      2019 13:35    TPro  6.7  /  Alb  3.2<L>  /  TBili  0.2  /  DBili  x   /  AST  27  /  ALT  16  /  AlkPhos  160<H>    Urinalysis Basic - ( 2019 13:35 )    Color: YELLOW / Appearance: CLEAR / S.020 / pH: 6.0  Gluc: NEGATIVE / Ketone: NEGATIVE  / Bili: NEGATIVE / Urobili: NORMAL   Blood: NEGATIVE / Protein: 30 / Nitrite: NEGATIVE   Leuk Esterase: NEGATIVE / RBC: 3-5 / WBC 3-5   Sq Epi: FEW / Non Sq Epi: x / Bacteria: FEW

## 2019-06-08 NOTE — OB PROVIDER H&P - NSHPPHYSICALEXAM_GEN_ALL_CORE
pt seen and examined    abd soft    Vital Signs Last 24 Hrs  T(C): 36.9 (08 Jun 2019 13:20), Max: 36.9 (08 Jun 2019 13:19)  T(F): 98.42 (08 Jun 2019 13:20), Max: 98.42 (08 Jun 2019 13:20)  HR: 77 (08 Jun 2019 13:52) (74 - 84)  BP: 138/80 (08 Jun 2019 13:52) (134/94 - 142/83)  BP(mean): --  RR: 16 (08 Jun 2019 13:19) (16 - 16)  Vital Signs Last 24 Hrs  T(C): 36.9 (08 Jun 2019 13:20), Max: 36.9 (08 Jun 2019 13:19)  T(F): 98.42 (08 Jun 2019 13:20), Max: 98.42 (08 Jun 2019 13:20)  HR: 80 (08 Jun 2019 15:32) (72 - 84)  BP: 160/95 (08 Jun 2019 15:32) (129/63 - 167/100)

## 2019-06-08 NOTE — OB PROVIDER H&P - HISTORY OF PRESENT ILLNESS
27 yo  @35.1 presents from home stating BP at home monitor /100 and stated that she was seent in triage on  after had documented elevated BP at the office of  /96 144/89 of and +2 pitting edema. pt stated was admitted on  and discharged on  after being monitored for BP and also did 24 U collection and  was told to monitor BP at home  pt stated that she had Headache last pm which resolved  stated not on any HTN meds and denied any hx prior to  office visit   denies ha, visual disturbances or epigastric pain on arrival today to triage   pt reports 13lb weight gain over 2 wks  denies n/v/d  denies cx, vb, lof  reports + fetal movement  IVF preg, otherwise antepartum uncomplicated    review of BP on   in sunrisw   BP: 138/84 (19 @ 18:42) (138/84 - 158/85).      medhx denied   surg hx  as below    GYN hx   L ectopic preg left salpingectomy   ectopic Methotrexate  2018 right salpingectomy for fluid on ultrasound before ivf  IVF this pregnancy

## 2019-06-08 NOTE — OB PROVIDER TRIAGE NOTE - HISTORY OF PRESENT ILLNESS
2008 L ectopic preg left salpingectomy  2012 ectopic Methotrexate  2018 right salpingectomy for fluid on ultrasound before ivf  IVF this pregnancy 27 yo  @35.1 presents from home stating BP at home monitor /100 and stated that she was seent in triage on  after had documented elevated BP at the office of  /96 144/89 of and +2 pitting edema. pt stated was admitted on  and discharged on  after being monitored for BP and also did 24 U collection and  was told to monitor BP at home  pt stated that she had Headache last pm which resolved  stated not on any HTN meds and denied any hx prior to  office visit   denies ha, visual disturbances or epigastric pain on arrival today to triage   pt reports 13lb weight gain over 2 wks  denies n/v/d  denies cx, vb, lof  reports + fetal movement  IVF preg, otherwise antepartum uncomplicated    review of BP on   in sunrisw   BP: 138/84 (19 @ 18:42) (138/84 - 158/85).      medhx denied   surg hx  as below    GYN hx   L ectopic preg left salpingectomy   ectopic Methotrexate  2018 right salpingectomy for fluid on ultrasound before ivf  IVF this pregnancy

## 2019-06-08 NOTE — OB PROVIDER H&P - ASSESSMENT
27 yo  @35.1 presents from home stating BP at home monitor /100 and stated that she was seent in triage on  after had documented elevated BP at the office of  /96 144/89 of and +2 pitting edema. pt stated was admitted on  and discharged on  after being monitored for BP and also did 24 U collection and  was told to monitor BP at home  pt stated that she had Headache last pm which resolved  stated not on any HTN meds and denied any hx prior to  office visit   denies ha, visual disturbances or epigastric pain on arrival today to triage   pt reports 13lb weight gain over 2 wks  denies n/v/d  denies cx, vb, lof  reports + fetal movement  IVF preg, otherwise antepartum uncomplicated    review of BP on   in sunrise BP: 138/84 (19 @ 18:42) (138/84 - 158/85).  Vital Signs Last 24 Hrs  T(C): 36.9 (2019 13:20), Max: 36.9 (2019 13:19)  T(F): 98.42 (2019 13:20), Max: 98.42 (2019 13:20)  HR: 77 (2019 16:12) (72 - 87)  BP: 144/87 (2019 16:22) (129/63 - 167/100)  BP(mean): --  RR: 16 (2019 13:19) (16 - 16)  SpO2: --    medhx denied   surg hx  as below    GYN hx  2008 L ectopic preg left salpingectomy  2012 ectopic Methotrexate  2018 right salpingectomy for fluid on ultrasound before ivf  IVF this pregnancy     pt admitted at 35.3 wks sPEC  requiring meds    betamethasone  therapy

## 2019-06-08 NOTE — CHART NOTE - NSCHARTNOTEFT_GEN_A_CORE
R3 OB Tracing Note    T(C): 36.8 (19 @ 23:40), Max: 36.9 (19 @ 13:19)  HR: 88 (19 @ 23:45) (72 - 100)  BP: 156/78 (19 @ 23:39) (125/62 - 167/100)  RR: 16 (19 @ 17:05) (16 - 16)  SpO2: 98% (19 @ 23:45) (92% - 98%)    SVE deferred    FHT bl 125, mod variability, accels, no decels  TOCO irreg ctx    AP: 28y y/o , induction for sPEC on magnesium with cat I tracing with BPs well controlled  -continue magnesium, BP monitoring, symptom monitoring  -Continue induction with PO cytotec  -Ampicillin for GBS ppx    NUHA Jimenes PGY3

## 2019-06-08 NOTE — OB PROVIDER TRIAGE NOTE - NSHPPHYSICALEXAM_GEN_ALL_CORE
pt seen and examined    abd soft    Vital Signs Last 24 Hrs  T(C): 36.9 (08 Jun 2019 13:20), Max: 36.9 (08 Jun 2019 13:19)  T(F): 98.42 (08 Jun 2019 13:20), Max: 98.42 (08 Jun 2019 13:20)  HR: 77 (08 Jun 2019 13:52) (74 - 84)  BP: 138/80 (08 Jun 2019 13:52) (134/94 - 142/83)  BP(mean): --  RR: 16 (08 Jun 2019 13:19) (16 - 16) pt seen and examined    abd soft    Vital Signs Last 24 Hrs  T(C): 36.9 (08 Jun 2019 13:20), Max: 36.9 (08 Jun 2019 13:19)  T(F): 98.42 (08 Jun 2019 13:20), Max: 98.42 (08 Jun 2019 13:20)  HR: 77 (08 Jun 2019 13:52) (74 - 84)  BP: 138/80 (08 Jun 2019 13:52) (134/94 - 142/83)  BP(mean): --  RR: 16 (08 Jun 2019 13:19) (16 - 16)  Vital Signs Last 24 Hrs  T(C): 36.9 (08 Jun 2019 13:20), Max: 36.9 (08 Jun 2019 13:19)  T(F): 98.42 (08 Jun 2019 13:20), Max: 98.42 (08 Jun 2019 13:20)  HR: 80 (08 Jun 2019 15:32) (72 - 84)  BP: 160/95 (08 Jun 2019 15:32) (129/63 - 167/100)

## 2019-06-09 LAB
ALBUMIN SERPL ELPH-MCNC: 3.4 G/DL — SIGNIFICANT CHANGE UP (ref 3.3–5)
ALP SERPL-CCNC: 172 U/L — HIGH (ref 40–120)
ALT FLD-CCNC: 15 U/L — SIGNIFICANT CHANGE UP (ref 4–33)
ANION GAP SERPL CALC-SCNC: 16 MMO/L — HIGH (ref 7–14)
APTT BLD: 23.4 SEC — LOW (ref 27.5–36.3)
AST SERPL-CCNC: 24 U/L — SIGNIFICANT CHANGE UP (ref 4–32)
BASOPHILS # BLD AUTO: 0.02 K/UL — SIGNIFICANT CHANGE UP (ref 0–0.2)
BASOPHILS NFR BLD AUTO: 0.1 % — SIGNIFICANT CHANGE UP (ref 0–2)
BILIRUB SERPL-MCNC: 0.2 MG/DL — SIGNIFICANT CHANGE UP (ref 0.2–1.2)
BUN SERPL-MCNC: 10 MG/DL — SIGNIFICANT CHANGE UP (ref 7–23)
CALCIUM SERPL-MCNC: 8.1 MG/DL — LOW (ref 8.4–10.5)
CHLORIDE SERPL-SCNC: 99 MMOL/L — SIGNIFICANT CHANGE UP (ref 98–107)
CO2 SERPL-SCNC: 18 MMOL/L — LOW (ref 22–31)
CREAT SERPL-MCNC: 0.63 MG/DL — SIGNIFICANT CHANGE UP (ref 0.5–1.3)
EOSINOPHIL # BLD AUTO: 0.01 K/UL — SIGNIFICANT CHANGE UP (ref 0–0.5)
EOSINOPHIL NFR BLD AUTO: 0.1 % — SIGNIFICANT CHANGE UP (ref 0–6)
FIBRINOGEN PPP-MCNC: 721 MG/DL — HIGH (ref 350–510)
GLUCOSE SERPL-MCNC: 121 MG/DL — HIGH (ref 70–99)
GP B STREP GENITAL QL CULT: SIGNIFICANT CHANGE UP
HCT VFR BLD CALC: 35.7 % — SIGNIFICANT CHANGE UP (ref 34.5–45)
HGB BLD-MCNC: 12 G/DL — SIGNIFICANT CHANGE UP (ref 11.5–15.5)
IMM GRANULOCYTES NFR BLD AUTO: 0.5 % — SIGNIFICANT CHANGE UP (ref 0–1.5)
INR BLD: 0.86 — LOW (ref 0.88–1.17)
LDH SERPL L TO P-CCNC: 224 U/L — SIGNIFICANT CHANGE UP (ref 135–225)
LYMPHOCYTES # BLD AUTO: 1.82 K/UL — SIGNIFICANT CHANGE UP (ref 1–3.3)
LYMPHOCYTES # BLD AUTO: 12.9 % — LOW (ref 13–44)
MAGNESIUM SERPL-MCNC: 5.3 MG/DL — HIGH (ref 1.6–2.6)
MAGNESIUM SERPL-MCNC: 6.2 MG/DL — HIGH (ref 1.6–2.6)
MAGNESIUM SERPL-MCNC: 6.5 MG/DL — HIGH (ref 1.6–2.6)
MCHC RBC-ENTMCNC: 28.9 PG — SIGNIFICANT CHANGE UP (ref 27–34)
MCHC RBC-ENTMCNC: 33.6 % — SIGNIFICANT CHANGE UP (ref 32–36)
MCV RBC AUTO: 86 FL — SIGNIFICANT CHANGE UP (ref 80–100)
MONOCYTES # BLD AUTO: 1.25 K/UL — HIGH (ref 0–0.9)
MONOCYTES NFR BLD AUTO: 8.9 % — SIGNIFICANT CHANGE UP (ref 2–14)
NEUTROPHILS # BLD AUTO: 10.9 K/UL — HIGH (ref 1.8–7.4)
NEUTROPHILS NFR BLD AUTO: 77.5 % — HIGH (ref 43–77)
NRBC # FLD: 0 K/UL — SIGNIFICANT CHANGE UP (ref 0–0)
PLATELET # BLD AUTO: 274 K/UL — SIGNIFICANT CHANGE UP (ref 150–400)
PMV BLD: 10.9 FL — SIGNIFICANT CHANGE UP (ref 7–13)
POTASSIUM SERPL-MCNC: 4 MMOL/L — SIGNIFICANT CHANGE UP (ref 3.5–5.3)
POTASSIUM SERPL-SCNC: 4 MMOL/L — SIGNIFICANT CHANGE UP (ref 3.5–5.3)
PROT SERPL-MCNC: 6.9 G/DL — SIGNIFICANT CHANGE UP (ref 6–8.3)
PROTHROM AB SERPL-ACNC: 9.8 SEC — SIGNIFICANT CHANGE UP (ref 9.8–13.1)
RBC # BLD: 4.15 M/UL — SIGNIFICANT CHANGE UP (ref 3.8–5.2)
RBC # FLD: 15.1 % — HIGH (ref 10.3–14.5)
SODIUM SERPL-SCNC: 133 MMOL/L — LOW (ref 135–145)
T PALLIDUM AB TITR SER: NEGATIVE — SIGNIFICANT CHANGE UP
URATE SERPL-MCNC: 5.7 MG/DL — SIGNIFICANT CHANGE UP (ref 2.5–7)
WBC # BLD: 14.07 K/UL — HIGH (ref 3.8–10.5)
WBC # FLD AUTO: 14.07 K/UL — HIGH (ref 3.8–10.5)

## 2019-06-09 RX ORDER — BUTORPHANOL TARTRATE 2 MG/ML
2 INJECTION, SOLUTION INTRAMUSCULAR; INTRAVENOUS ONCE
Refills: 0 | Status: DISCONTINUED | OUTPATIENT
Start: 2019-06-09 | End: 2019-06-09

## 2019-06-09 RX ORDER — AMPICILLIN TRIHYDRATE 250 MG
1 CAPSULE ORAL EVERY 4 HOURS
Refills: 0 | Status: DISCONTINUED | OUTPATIENT
Start: 2019-06-09 | End: 2019-06-09

## 2019-06-09 RX ADMIN — Medication 108 GRAM(S): at 07:46

## 2019-06-09 RX ADMIN — BUTORPHANOL TARTRATE 2 MILLIGRAM(S): 2 INJECTION, SOLUTION INTRAMUSCULAR; INTRAVENOUS at 08:36

## 2019-06-09 RX ADMIN — SODIUM CHLORIDE 3 MILLILITER(S): 9 INJECTION INTRAMUSCULAR; INTRAVENOUS; SUBCUTANEOUS at 13:57

## 2019-06-09 RX ADMIN — Medication 108 GRAM(S): at 15:48

## 2019-06-09 RX ADMIN — Medication 50 GM/HR: at 19:13

## 2019-06-09 RX ADMIN — Medication 12 MILLIGRAM(S): at 16:40

## 2019-06-09 RX ADMIN — Medication 108 GRAM(S): at 19:48

## 2019-06-09 RX ADMIN — Medication 50 GM/HR: at 07:07

## 2019-06-09 RX ADMIN — BUTORPHANOL TARTRATE 2 MILLIGRAM(S): 2 INJECTION, SOLUTION INTRAMUSCULAR; INTRAVENOUS at 08:57

## 2019-06-09 RX ADMIN — SODIUM CHLORIDE 3 MILLILITER(S): 9 INJECTION INTRAMUSCULAR; INTRAVENOUS; SUBCUTANEOUS at 22:00

## 2019-06-09 RX ADMIN — Medication 108 GRAM(S): at 11:43

## 2019-06-09 RX ADMIN — Medication 108 GRAM(S): at 03:51

## 2019-06-09 NOTE — CHART NOTE - NSCHARTNOTEFT_GEN_A_CORE
Pt seen and examined    VS  T(C): 36.7 (06-09-19 @ 03:49)  HR: 97 (06-09-19 @ 07:04)  BP: 130/73 (06-09-19 @ 06:39)  RR: 16 (06-08-19 @ 17:05)  SpO2: 94% (06-09-19 @ 07:01)    VE: 0/0/-3  FHT: 120, mod jeancarlos, +accels, no decels  [place]: q2-3    P:  - CB placed  - c/w po cytotec    NOBLE Allison, PGY-3

## 2019-06-09 NOTE — CHART NOTE - NSCHARTNOTEFT_GEN_A_CORE
OB Attg  Pt has no c/o.  Denies HA/visual changes/CP/SOB/RUQ-epig pain.  EFM 120s, pos accels, mod variability, no decels  toco no ctx  Vital Signs Last 24 Hrs  T(C): 36.8 (09 Jun 2019 19:49), Max: 37.2 (09 Jun 2019 07:51)  T(F): 98.24 (09 Jun 2019 19:49), Max: 98.96 (09 Jun 2019 07:51)  HR: 83 (09 Jun 2019 22:03) (74 - 100)  BP: 141/76 (09 Jun 2019 21:55) (110/53 - 159/87)    SpO2: 97% (09 Jun 2019 22:03) (91% - 99%)  VE unchanged and cervical balloon in situ    P0 at 35wks with sPEC for IOL s/p full course of PO cytotec  WIll cont IOL with vaginal cytotec and maintain cervical balloon

## 2019-06-09 NOTE — CHART NOTE - NSCHARTNOTEFT_GEN_A_CORE
R3 OB Progress Note    Patient seen and examined at bedside.  Cervical balloon placement attempted unsuccessfully.   Patient without complaints. Denies headache, blurry vision, and epigastric pain    T(C): 36.8 (06-08-19 @ 23:40), Max: 36.9 (06-08-19 @ 13:19)  HR: 94 (06-09-19 @ 00:26) (72 - 100)  BP: 158/73 (06-09-19 @ 00:24) (125/62 - 167/100)  RR: 16 (06-08-19 @ 17:05) (16 - 16)  SpO2: 97% (06-09-19 @ 00:26) (92% - 98%)    SVE 0/30/-3      AP: 28y y/o P0  with cat I tracing.  sPEC on Mg with well-controlled BPs  -continue induction with PO cytotec, will reattempt balloon later      NUHA Jimenes PGY3 R3 OB Progress Note    Patient seen and examined at bedside.  Cervical balloon placement attempted unsuccessfully.   Patient without complaints. Denies headache, blurry vision, and epigastric pain    T(C): 36.8 (06-08-19 @ 23:40), Max: 36.9 (06-08-19 @ 13:19)  HR: 94 (06-09-19 @ 00:26) (72 - 100)  BP: 158/73 (06-09-19 @ 00:24) (125/62 - 167/100)  RR: 16 (06-08-19 @ 17:05) (16 - 16)  SpO2: 97% (06-09-19 @ 00:26) (92% - 98%)    SVE 0/50/-3      AP: 28y y/o P0  with cat I tracing.  sPEC on Mg with well-controlled BPs  -continue induction with PO cytotec, will reattempt balloon later      NUHA Jimenes PGY3

## 2019-06-10 DIAGNOSIS — O14.90 UNSPECIFIED PRE-ECLAMPSIA, UNSPECIFIED TRIMESTER: ICD-10-CM

## 2019-06-10 LAB
ALBUMIN SERPL ELPH-MCNC: 3.5 G/DL — SIGNIFICANT CHANGE UP (ref 3.3–5)
ALBUMIN SERPL ELPH-MCNC: 3.5 G/DL — SIGNIFICANT CHANGE UP (ref 3.3–5)
ALP SERPL-CCNC: 161 U/L — HIGH (ref 40–120)
ALP SERPL-CCNC: 169 U/L — HIGH (ref 40–120)
ALT FLD-CCNC: 16 U/L — SIGNIFICANT CHANGE UP (ref 4–33)
ALT FLD-CCNC: 17 U/L — SIGNIFICANT CHANGE UP (ref 4–33)
ANION GAP SERPL CALC-SCNC: 14 MMO/L — SIGNIFICANT CHANGE UP (ref 7–14)
ANION GAP SERPL CALC-SCNC: 15 MMO/L — HIGH (ref 7–14)
APTT BLD: 22.2 SEC — LOW (ref 27.5–36.3)
APTT BLD: 22.8 SEC — LOW (ref 27.5–36.3)
AST SERPL-CCNC: 24 U/L — SIGNIFICANT CHANGE UP (ref 4–32)
AST SERPL-CCNC: 24 U/L — SIGNIFICANT CHANGE UP (ref 4–32)
BASOPHILS # BLD AUTO: 0.01 K/UL — SIGNIFICANT CHANGE UP (ref 0–0.2)
BASOPHILS # BLD AUTO: 0.02 K/UL — SIGNIFICANT CHANGE UP (ref 0–0.2)
BASOPHILS # BLD AUTO: 0.03 K/UL — SIGNIFICANT CHANGE UP (ref 0–0.2)
BASOPHILS NFR BLD AUTO: 0.1 % — SIGNIFICANT CHANGE UP (ref 0–2)
BASOPHILS NFR BLD AUTO: 0.2 % — SIGNIFICANT CHANGE UP (ref 0–2)
BASOPHILS NFR BLD AUTO: 0.3 % — SIGNIFICANT CHANGE UP (ref 0–2)
BILIRUB SERPL-MCNC: 0.2 MG/DL — SIGNIFICANT CHANGE UP (ref 0.2–1.2)
BILIRUB SERPL-MCNC: < 0.2 MG/DL — LOW (ref 0.2–1.2)
BUN SERPL-MCNC: 10 MG/DL — SIGNIFICANT CHANGE UP (ref 7–23)
BUN SERPL-MCNC: 10 MG/DL — SIGNIFICANT CHANGE UP (ref 7–23)
CALCIUM SERPL-MCNC: 7 MG/DL — LOW (ref 8.4–10.5)
CALCIUM SERPL-MCNC: 7.4 MG/DL — LOW (ref 8.4–10.5)
CHLORIDE SERPL-SCNC: 98 MMOL/L — SIGNIFICANT CHANGE UP (ref 98–107)
CHLORIDE SERPL-SCNC: 99 MMOL/L — SIGNIFICANT CHANGE UP (ref 98–107)
CO2 SERPL-SCNC: 19 MMOL/L — LOW (ref 22–31)
CO2 SERPL-SCNC: 20 MMOL/L — LOW (ref 22–31)
CREAT SERPL-MCNC: 0.61 MG/DL — SIGNIFICANT CHANGE UP (ref 0.5–1.3)
CREAT SERPL-MCNC: 0.64 MG/DL — SIGNIFICANT CHANGE UP (ref 0.5–1.3)
EOSINOPHIL # BLD AUTO: 0 K/UL — SIGNIFICANT CHANGE UP (ref 0–0.5)
EOSINOPHIL NFR BLD AUTO: 0 % — SIGNIFICANT CHANGE UP (ref 0–6)
FIBRINOGEN PPP-MCNC: 569 MG/DL — HIGH (ref 350–510)
FIBRINOGEN PPP-MCNC: 643.4 MG/DL — HIGH (ref 350–510)
FIBRINOGEN PPP-MCNC: 694.5 MG/DL — HIGH (ref 350–510)
GLUCOSE SERPL-MCNC: 118 MG/DL — HIGH (ref 70–99)
GLUCOSE SERPL-MCNC: 122 MG/DL — HIGH (ref 70–99)
HCT VFR BLD CALC: 31.9 % — LOW (ref 34.5–45)
HCT VFR BLD CALC: 34.4 % — LOW (ref 34.5–45)
HCT VFR BLD CALC: 34.9 % — SIGNIFICANT CHANGE UP (ref 34.5–45)
HGB BLD-MCNC: 10.6 G/DL — LOW (ref 11.5–15.5)
HGB BLD-MCNC: 11.6 G/DL — SIGNIFICANT CHANGE UP (ref 11.5–15.5)
HGB BLD-MCNC: 11.7 G/DL — SIGNIFICANT CHANGE UP (ref 11.5–15.5)
IMM GRANULOCYTES NFR BLD AUTO: 1 % — SIGNIFICANT CHANGE UP (ref 0–1.5)
IMM GRANULOCYTES NFR BLD AUTO: 1.5 % — SIGNIFICANT CHANGE UP (ref 0–1.5)
IMM GRANULOCYTES NFR BLD AUTO: 2.1 % — HIGH (ref 0–1.5)
INR BLD: 0.8 — LOW (ref 0.88–1.17)
INR BLD: 0.87 — LOW (ref 0.88–1.17)
INR BLD: 0.89 — SIGNIFICANT CHANGE UP (ref 0.88–1.17)
LDH SERPL L TO P-CCNC: 235 U/L — HIGH (ref 135–225)
LDH SERPL L TO P-CCNC: 255 U/L — HIGH (ref 135–225)
LYMPHOCYTES # BLD AUTO: 1.45 K/UL — SIGNIFICANT CHANGE UP (ref 1–3.3)
LYMPHOCYTES # BLD AUTO: 1.79 K/UL — SIGNIFICANT CHANGE UP (ref 1–3.3)
LYMPHOCYTES # BLD AUTO: 10.9 % — LOW (ref 13–44)
LYMPHOCYTES # BLD AUTO: 14.7 % — SIGNIFICANT CHANGE UP (ref 13–44)
LYMPHOCYTES # BLD AUTO: 19.4 % — SIGNIFICANT CHANGE UP (ref 13–44)
LYMPHOCYTES # BLD AUTO: 2.08 K/UL — SIGNIFICANT CHANGE UP (ref 1–3.3)
MAGNESIUM SERPL-MCNC: 6.4 MG/DL — HIGH (ref 1.6–2.6)
MAGNESIUM SERPL-MCNC: 6.5 MG/DL — HIGH (ref 1.6–2.6)
MAGNESIUM SERPL-MCNC: 6.7 MG/DL — HIGH (ref 1.6–2.6)
MCHC RBC-ENTMCNC: 28.5 PG — SIGNIFICANT CHANGE UP (ref 27–34)
MCHC RBC-ENTMCNC: 28.7 PG — SIGNIFICANT CHANGE UP (ref 27–34)
MCHC RBC-ENTMCNC: 29.1 PG — SIGNIFICANT CHANGE UP (ref 27–34)
MCHC RBC-ENTMCNC: 33.2 % — SIGNIFICANT CHANGE UP (ref 32–36)
MCHC RBC-ENTMCNC: 33.5 % — SIGNIFICANT CHANGE UP (ref 32–36)
MCHC RBC-ENTMCNC: 33.7 % — SIGNIFICANT CHANGE UP (ref 32–36)
MCV RBC AUTO: 85.1 FL — SIGNIFICANT CHANGE UP (ref 80–100)
MCV RBC AUTO: 86.4 FL — SIGNIFICANT CHANGE UP (ref 80–100)
MCV RBC AUTO: 86.4 FL — SIGNIFICANT CHANGE UP (ref 80–100)
MONOCYTES # BLD AUTO: 0.64 K/UL — SIGNIFICANT CHANGE UP (ref 0–0.9)
MONOCYTES # BLD AUTO: 0.64 K/UL — SIGNIFICANT CHANGE UP (ref 0–0.9)
MONOCYTES # BLD AUTO: 1.24 K/UL — HIGH (ref 0–0.9)
MONOCYTES NFR BLD AUTO: 11.6 % — SIGNIFICANT CHANGE UP (ref 2–14)
MONOCYTES NFR BLD AUTO: 4.8 % — SIGNIFICANT CHANGE UP (ref 2–14)
MONOCYTES NFR BLD AUTO: 5.3 % — SIGNIFICANT CHANGE UP (ref 2–14)
NEUTROPHILS # BLD AUTO: 10.95 K/UL — HIGH (ref 1.8–7.4)
NEUTROPHILS # BLD AUTO: 7.15 K/UL — SIGNIFICANT CHANGE UP (ref 1.8–7.4)
NEUTROPHILS # BLD AUTO: 9.59 K/UL — HIGH (ref 1.8–7.4)
NEUTROPHILS NFR BLD AUTO: 66.6 % — SIGNIFICANT CHANGE UP (ref 43–77)
NEUTROPHILS NFR BLD AUTO: 78.9 % — HIGH (ref 43–77)
NEUTROPHILS NFR BLD AUTO: 82.6 % — HIGH (ref 43–77)
NRBC # FLD: 0 K/UL — SIGNIFICANT CHANGE UP (ref 0–0)
NRBC # FLD: 0 K/UL — SIGNIFICANT CHANGE UP (ref 0–0)
NRBC # FLD: 0.02 K/UL — SIGNIFICANT CHANGE UP (ref 0–0)
PLATELET # BLD AUTO: 244 K/UL — SIGNIFICANT CHANGE UP (ref 150–400)
PLATELET # BLD AUTO: 268 K/UL — SIGNIFICANT CHANGE UP (ref 150–400)
PLATELET # BLD AUTO: 271 K/UL — SIGNIFICANT CHANGE UP (ref 150–400)
PMV BLD: 10.6 FL — SIGNIFICANT CHANGE UP (ref 7–13)
PMV BLD: 10.6 FL — SIGNIFICANT CHANGE UP (ref 7–13)
PMV BLD: 11.1 FL — SIGNIFICANT CHANGE UP (ref 7–13)
POTASSIUM SERPL-MCNC: 3.7 MMOL/L — SIGNIFICANT CHANGE UP (ref 3.5–5.3)
POTASSIUM SERPL-MCNC: 4 MMOL/L — SIGNIFICANT CHANGE UP (ref 3.5–5.3)
POTASSIUM SERPL-SCNC: 3.7 MMOL/L — SIGNIFICANT CHANGE UP (ref 3.5–5.3)
POTASSIUM SERPL-SCNC: 4 MMOL/L — SIGNIFICANT CHANGE UP (ref 3.5–5.3)
PROT SERPL-MCNC: 6.9 G/DL — SIGNIFICANT CHANGE UP (ref 6–8.3)
PROT SERPL-MCNC: 7.1 G/DL — SIGNIFICANT CHANGE UP (ref 6–8.3)
PROTHROM AB SERPL-ACNC: 9 SEC — LOW (ref 9.8–13.1)
PROTHROM AB SERPL-ACNC: 9.6 SEC — LOW (ref 9.8–13.1)
PROTHROM AB SERPL-ACNC: 9.8 SEC — SIGNIFICANT CHANGE UP (ref 9.8–13.1)
RBC # BLD: 3.69 M/UL — LOW (ref 3.8–5.2)
RBC # BLD: 3.98 M/UL — SIGNIFICANT CHANGE UP (ref 3.8–5.2)
RBC # BLD: 4.1 M/UL — SIGNIFICANT CHANGE UP (ref 3.8–5.2)
RBC # FLD: 15 % — HIGH (ref 10.3–14.5)
RBC # FLD: 15.1 % — HIGH (ref 10.3–14.5)
RBC # FLD: 15.2 % — HIGH (ref 10.3–14.5)
SODIUM SERPL-SCNC: 132 MMOL/L — LOW (ref 135–145)
SODIUM SERPL-SCNC: 133 MMOL/L — LOW (ref 135–145)
URATE SERPL-MCNC: 5.8 MG/DL — SIGNIFICANT CHANGE UP (ref 2.5–7)
URATE SERPL-MCNC: 6.2 MG/DL — SIGNIFICANT CHANGE UP (ref 2.5–7)
WBC # BLD: 10.72 K/UL — HIGH (ref 3.8–10.5)
WBC # BLD: 12.15 K/UL — HIGH (ref 3.8–10.5)
WBC # BLD: 13.26 K/UL — HIGH (ref 3.8–10.5)
WBC # FLD AUTO: 10.72 K/UL — HIGH (ref 3.8–10.5)
WBC # FLD AUTO: 12.15 K/UL — HIGH (ref 3.8–10.5)
WBC # FLD AUTO: 13.26 K/UL — HIGH (ref 3.8–10.5)

## 2019-06-10 RX ORDER — FAMOTIDINE 10 MG/ML
20 INJECTION INTRAVENOUS ONCE
Refills: 0 | Status: COMPLETED | OUTPATIENT
Start: 2019-06-10 | End: 2019-06-10

## 2019-06-10 RX ADMIN — Medication 50 GM/HR: at 19:15

## 2019-06-10 RX ADMIN — FAMOTIDINE 20 MILLIGRAM(S): 10 INJECTION INTRAVENOUS at 17:08

## 2019-06-10 RX ADMIN — Medication 50 GM/HR: at 07:07

## 2019-06-10 RX ADMIN — SODIUM CHLORIDE 3 MILLILITER(S): 9 INJECTION INTRAMUSCULAR; INTRAVENOUS; SUBCUTANEOUS at 05:45

## 2019-06-10 RX ADMIN — FAMOTIDINE 20 MILLIGRAM(S): 10 INJECTION INTRAVENOUS at 06:07

## 2019-06-10 NOTE — CHART NOTE - NSCHARTNOTEFT_GEN_A_CORE
PA Note    Patient seen & examined for cont of management & removal of cervical balloon. Comfortable, denies contractions    VS  T(C): 36.8 (06-10-19 @ 06:52)  HR: 86 (06-10-19 @ 07:38)  BP: 126/71 (06-10-19 @ 07:24)  RR: --  SpO2: 97% (06-10-19 @ 07:38)    120/mod jeancarlos/+accels/no decels  [place] occ ctx  3/60/-3 posterior     cont efm/toco   vaginal cytotec placed  cont BP monitoring & Mg   HELLP labs at 11am    dw Dr Helen pollock

## 2019-06-10 NOTE — CHART NOTE - NSCHARTNOTEFT_GEN_A_CORE
OB Attg  Pt feels mild lightheadedness on Mg, otherwise no c/o  Categ 1 FHT  Cervical lee in situ, tugged and firmly in place  Will d/c lee and place next vagina; cytotec at 730am

## 2019-06-10 NOTE — CHART NOTE - NSCHARTNOTEFT_GEN_A_CORE
ob attending note  patient seen and examined at bedside  arom, thin mec  10cm/100/-1  left lateral, peanut ball  does not feel urge to push  will re-assess in 30 min - 1 hr

## 2019-06-10 NOTE — CHART NOTE - NSCHARTNOTEFT_GEN_A_CORE
PA Note    patient seen & examined for placement of vaginal cytotec. comfortable    VS  T(C): 36.8 (06-10-19 @ 15:32)  HR: 88 (06-10-19 @ 17:36)  BP: 141/81 (06-10-19 @ 17:24)  RR: --  SpO2: 99% (06-10-19 @ 17:41)    125/mod jeancarlos/+accels/no decels  [place] no ctx  3/60/-3    vaginal cytotec placed  cont Mg & BP monitoring  dw Dr Lanie pollock

## 2019-06-10 NOTE — CHART NOTE - NSCHARTNOTEFT_GEN_A_CORE
PA Note    Patient seen & examined for placement of vaginal cytotec. Patient denies pain    VS  T(C): 36.8 (06-10-19 @ 06:52)  HR: 92 (06-10-19 @ 11:29)  BP: 150/79 (06-10-19 @ 11:24)  RR: --  SpO2: 95% (06-10-19 @ 11:29)    120/mod jeancarlos/+accels/no decels  [place] occ ctx  3/60/-3 vaginal cytotec placed    cont efm/toco  cont current management  cont BP monitoring & Mg gtt.     zamzam pollock

## 2019-06-10 NOTE — CHART NOTE - NSCHARTNOTEFT_GEN_A_CORE
Pt seen for placement of 6th dose of vaginal cytotec. VE: 3/60/-3    EFM: Cat I  [place]: q10min    Vital Signs Last 24 Hrs  T(C): 36.4 (10 Boby 2019 19:30), Max: 36.9 (10 Boby 2019 11:29)  T(F): 97.52 (10 Boby 2019 19:30), Max: 98.42 (10 Boby 2019 11:29)  HR: 83 (10 Boby 2019 22:27) (74 - 104)  BP: 140/87 (10 Boby 2019 22:24) (115/54 - 151/83)  SpO2: 99% (10 Boby 2019 22:22) (88% - 100%)    P:  -c/w vaginal cytotec  -c/w magnesium sulfate    D/w Dr. Jerrod Greenwood, PGY-1

## 2019-06-10 NOTE — CHART NOTE - NSCHARTNOTEFT_GEN_A_CORE
Pt seen and examined at bedside for placement of 2nd dose of vaginal cytotec.  Cervical Balloon still in place. 2cm dilation    EFM: 120, mod jeancarlos, +accels, no decels  [employer]: regular, q7min    P:  -c/w vaginal cytotec  -c/w Magnesium sulfate for SPEC    -S. Krystyna, PGY-1

## 2019-06-10 NOTE — CHART NOTE - NSCHARTNOTEFT_GEN_A_CORE
OB Attg  Patient evaluated during induction. VE unchanged s/p Vaginal cytotec.   BP controlled, no antihypertensives required. No signs of worsening preeclampsia.   Plan to give patient a break, allow regular diet x1.   Will restart induction and continue magnesium  Overall reassuring fetal status.

## 2019-06-11 ENCOUNTER — RESULT REVIEW (OUTPATIENT)
Age: 29
End: 2019-06-11

## 2019-06-11 ENCOUNTER — APPOINTMENT (OUTPATIENT)
Dept: ANTEPARTUM | Facility: CLINIC | Age: 29
End: 2019-06-11

## 2019-06-11 ENCOUNTER — APPOINTMENT (OUTPATIENT)
Dept: ANTEPARTUM | Facility: HOSPITAL | Age: 29
End: 2019-06-11

## 2019-06-11 LAB
ALBUMIN SERPL ELPH-MCNC: 3.4 G/DL — SIGNIFICANT CHANGE UP (ref 3.3–5)
ALBUMIN SERPL ELPH-MCNC: 3.5 G/DL — SIGNIFICANT CHANGE UP (ref 3.3–5)
ALP SERPL-CCNC: 152 U/L — HIGH (ref 40–120)
ALP SERPL-CCNC: 162 U/L — HIGH (ref 40–120)
ALT FLD-CCNC: 18 U/L — SIGNIFICANT CHANGE UP (ref 4–33)
ALT FLD-CCNC: 20 U/L — SIGNIFICANT CHANGE UP (ref 4–33)
ANION GAP SERPL CALC-SCNC: 12 MMO/L — SIGNIFICANT CHANGE UP (ref 7–14)
ANION GAP SERPL CALC-SCNC: 14 MMO/L — SIGNIFICANT CHANGE UP (ref 7–14)
APTT BLD: 22.9 SEC — LOW (ref 27.5–36.3)
AST SERPL-CCNC: 26 U/L — SIGNIFICANT CHANGE UP (ref 4–32)
AST SERPL-CCNC: 28 U/L — SIGNIFICANT CHANGE UP (ref 4–32)
BASOPHILS # BLD AUTO: 0.02 K/UL — SIGNIFICANT CHANGE UP (ref 0–0.2)
BASOPHILS NFR BLD AUTO: 0.2 % — SIGNIFICANT CHANGE UP (ref 0–2)
BILIRUB SERPL-MCNC: < 0.2 MG/DL — LOW (ref 0.2–1.2)
BILIRUB SERPL-MCNC: < 0.2 MG/DL — LOW (ref 0.2–1.2)
BUN SERPL-MCNC: 11 MG/DL — SIGNIFICANT CHANGE UP (ref 7–23)
BUN SERPL-MCNC: 12 MG/DL — SIGNIFICANT CHANGE UP (ref 7–23)
CALCIUM SERPL-MCNC: 6.9 MG/DL — LOW (ref 8.4–10.5)
CALCIUM SERPL-MCNC: 7.5 MG/DL — LOW (ref 8.4–10.5)
CHLORIDE SERPL-SCNC: 102 MMOL/L — SIGNIFICANT CHANGE UP (ref 98–107)
CHLORIDE SERPL-SCNC: 103 MMOL/L — SIGNIFICANT CHANGE UP (ref 98–107)
CO2 SERPL-SCNC: 22 MMOL/L — SIGNIFICANT CHANGE UP (ref 22–31)
CO2 SERPL-SCNC: 22 MMOL/L — SIGNIFICANT CHANGE UP (ref 22–31)
CREAT SERPL-MCNC: 0.64 MG/DL — SIGNIFICANT CHANGE UP (ref 0.5–1.3)
CREAT SERPL-MCNC: 0.73 MG/DL — SIGNIFICANT CHANGE UP (ref 0.5–1.3)
EOSINOPHIL # BLD AUTO: 0.01 K/UL — SIGNIFICANT CHANGE UP (ref 0–0.5)
EOSINOPHIL NFR BLD AUTO: 0.1 % — SIGNIFICANT CHANGE UP (ref 0–6)
FIBRINOGEN PPP-MCNC: 580 MG/DL — HIGH (ref 350–510)
GLUCOSE SERPL-MCNC: 134 MG/DL — HIGH (ref 70–99)
GLUCOSE SERPL-MCNC: 89 MG/DL — SIGNIFICANT CHANGE UP (ref 70–99)
HCT VFR BLD CALC: 34.2 % — LOW (ref 34.5–45)
HGB BLD-MCNC: 11.4 G/DL — LOW (ref 11.5–15.5)
IMM GRANULOCYTES NFR BLD AUTO: 1.4 % — SIGNIFICANT CHANGE UP (ref 0–1.5)
INR BLD: 0.8 — LOW (ref 0.88–1.17)
LDH SERPL L TO P-CCNC: 248 U/L — HIGH (ref 135–225)
LYMPHOCYTES # BLD AUTO: 2.76 K/UL — SIGNIFICANT CHANGE UP (ref 1–3.3)
LYMPHOCYTES # BLD AUTO: 24.4 % — SIGNIFICANT CHANGE UP (ref 13–44)
MAGNESIUM SERPL-MCNC: 6.2 MG/DL — HIGH (ref 1.6–2.6)
MAGNESIUM SERPL-MCNC: 6.3 MG/DL — HIGH (ref 1.6–2.6)
MAGNESIUM SERPL-MCNC: 6.4 MG/DL — HIGH (ref 1.6–2.6)
MAGNESIUM SERPL-MCNC: 6.8 MG/DL — HIGH (ref 1.6–2.6)
MCHC RBC-ENTMCNC: 28.5 PG — SIGNIFICANT CHANGE UP (ref 27–34)
MCHC RBC-ENTMCNC: 33.3 % — SIGNIFICANT CHANGE UP (ref 32–36)
MCV RBC AUTO: 85.5 FL — SIGNIFICANT CHANGE UP (ref 80–100)
MONOCYTES # BLD AUTO: 1.34 K/UL — HIGH (ref 0–0.9)
MONOCYTES NFR BLD AUTO: 11.8 % — SIGNIFICANT CHANGE UP (ref 2–14)
NEUTROPHILS # BLD AUTO: 7.04 K/UL — SIGNIFICANT CHANGE UP (ref 1.8–7.4)
NEUTROPHILS NFR BLD AUTO: 62.1 % — SIGNIFICANT CHANGE UP (ref 43–77)
NRBC # FLD: 0.04 K/UL — SIGNIFICANT CHANGE UP (ref 0–0)
PLATELET # BLD AUTO: 266 K/UL — SIGNIFICANT CHANGE UP (ref 150–400)
PMV BLD: 10.6 FL — SIGNIFICANT CHANGE UP (ref 7–13)
POTASSIUM SERPL-MCNC: 3.9 MMOL/L — SIGNIFICANT CHANGE UP (ref 3.5–5.3)
POTASSIUM SERPL-MCNC: 4.1 MMOL/L — SIGNIFICANT CHANGE UP (ref 3.5–5.3)
POTASSIUM SERPL-SCNC: 3.9 MMOL/L — SIGNIFICANT CHANGE UP (ref 3.5–5.3)
POTASSIUM SERPL-SCNC: 4.1 MMOL/L — SIGNIFICANT CHANGE UP (ref 3.5–5.3)
PROT SERPL-MCNC: 6.5 G/DL — SIGNIFICANT CHANGE UP (ref 6–8.3)
PROT SERPL-MCNC: 6.9 G/DL — SIGNIFICANT CHANGE UP (ref 6–8.3)
PROTHROM AB SERPL-ACNC: 9 SEC — LOW (ref 9.8–13.1)
RBC # BLD: 4 M/UL — SIGNIFICANT CHANGE UP (ref 3.8–5.2)
RBC # FLD: 15 % — HIGH (ref 10.3–14.5)
SODIUM SERPL-SCNC: 136 MMOL/L — SIGNIFICANT CHANGE UP (ref 135–145)
SODIUM SERPL-SCNC: 139 MMOL/L — SIGNIFICANT CHANGE UP (ref 135–145)
URATE SERPL-MCNC: 6.2 MG/DL — SIGNIFICANT CHANGE UP (ref 2.5–7)
URATE SERPL-MCNC: 6.3 MG/DL — SIGNIFICANT CHANGE UP (ref 2.5–7)
WBC # BLD: 11.33 K/UL — HIGH (ref 3.8–10.5)
WBC # FLD AUTO: 11.33 K/UL — HIGH (ref 3.8–10.5)

## 2019-06-11 RX ORDER — OXYTOCIN 10 UNIT/ML
20 VIAL (ML) INJECTION
Qty: 30 | Refills: 0 | Status: DISCONTINUED | OUTPATIENT
Start: 2019-06-11 | End: 2019-06-12

## 2019-06-11 RX ORDER — OXYTOCIN 10 UNIT/ML
2 VIAL (ML) INJECTION
Qty: 30 | Refills: 0 | Status: DISCONTINUED | OUTPATIENT
Start: 2019-06-11 | End: 2019-06-11

## 2019-06-11 RX ADMIN — Medication 2 MILLIUNIT(S)/MIN: at 14:13

## 2019-06-11 RX ADMIN — Medication 50 GM/HR: at 07:10

## 2019-06-11 RX ADMIN — Medication 2 MILLIUNIT(S)/MIN: at 20:54

## 2019-06-11 RX ADMIN — SODIUM CHLORIDE 50 MILLILITER(S): 9 INJECTION, SOLUTION INTRAVENOUS at 06:40

## 2019-06-11 NOTE — CHART NOTE - NSCHARTNOTEFT_GEN_A_CORE
Pt seen for placement of 8th dose of vaginal cytotec. VE: 3/60/-3    EFM: 120, mod jeancarlos, +accels, no decels  [place]: starting to have ctxs. Had 2 in 10min period    P:  -Now completed full course of vaginal cytotec.   -Re-assess in 4hrs    -MILTON Greenwood, PGY-1

## 2019-06-11 NOTE — CHART NOTE - NSCHARTNOTEFT_GEN_A_CORE
R1 tracing note      EFM:125/mod/+accels/-decels   [place]:Q4-5min      T(C): 36.8 (06-11-19 @ 12:55), Max: 36.8 (06-11-19 @ 12:55)  HR: 80 (06-11-19 @ 15:32) (64 - 105)  BP: 152/65 (06-11-19 @ 15:23) (118/55 - 161/86)  RR: 16 (06-11-19 @ 08:46) (16 - 16)  SpO2: 91% (06-11-19 @ 15:27) (91% - 100%)      Plan:  -Continue pitocin, started at 215pm, pt is sp PO/VC/CB  -Continue Mag for sPEC        c.paul pgy-1

## 2019-06-11 NOTE — CHART NOTE - NSCHARTNOTEFT_GEN_A_CORE
Patient comfortable  FHT category 1  VE 2/50/-3  s/p PO and vaginal cytotec and cervical balloon  AROM-clear fluid with fundal pressure  will hopefully start madhavi and will start pitocin

## 2019-06-11 NOTE — CHART NOTE - NSCHARTNOTEFT_GEN_A_CORE
OB attg  Pt feeling painful ctx  , pos accels, min to mod variability  toco irreg ctx/not well-detected  VE 4/80/-3  Severe preeclampsia at 36wks  IUPC placed  continue Pitocin and Magnesium sulfate

## 2019-06-12 ENCOUNTER — TRANSCRIPTION ENCOUNTER (OUTPATIENT)
Age: 29
End: 2019-06-12

## 2019-06-12 ENCOUNTER — OTHER (OUTPATIENT)
Age: 29
End: 2019-06-12

## 2019-06-12 LAB
APTT BLD: 25.1 SEC — LOW (ref 27.5–36.3)
BASOPHILS # BLD AUTO: 0.04 K/UL — SIGNIFICANT CHANGE UP (ref 0–0.2)
BASOPHILS NFR BLD AUTO: 0.2 % — SIGNIFICANT CHANGE UP (ref 0–2)
BLD GP AB SCN SERPL QL: NEGATIVE — SIGNIFICANT CHANGE UP
EOSINOPHIL # BLD AUTO: 0.04 K/UL — SIGNIFICANT CHANGE UP (ref 0–0.5)
EOSINOPHIL NFR BLD AUTO: 0.2 % — SIGNIFICANT CHANGE UP (ref 0–6)
FIBRINOGEN PPP-MCNC: 496 MG/DL — SIGNIFICANT CHANGE UP (ref 350–510)
HCT VFR BLD CALC: 23.6 % — LOW (ref 34.5–45)
HCT VFR BLD CALC: 25.6 % — LOW (ref 34.5–45)
HGB BLD-MCNC: 7.9 G/DL — LOW (ref 11.5–15.5)
HGB BLD-MCNC: 8.5 G/DL — LOW (ref 11.5–15.5)
IMM GRANULOCYTES NFR BLD AUTO: 0.9 % — SIGNIFICANT CHANGE UP (ref 0–1.5)
INR BLD: 0.86 — LOW (ref 0.88–1.17)
LDH SERPL L TO P-CCNC: 286 U/L — HIGH (ref 135–225)
LYMPHOCYTES # BLD AUTO: 1.7 K/UL — SIGNIFICANT CHANGE UP (ref 1–3.3)
LYMPHOCYTES # BLD AUTO: 8.8 % — LOW (ref 13–44)
MAGNESIUM SERPL-MCNC: 5.6 MG/DL — HIGH (ref 1.6–2.6)
MAGNESIUM SERPL-MCNC: 5.8 MG/DL — HIGH (ref 1.6–2.6)
MAGNESIUM SERPL-MCNC: 5.9 MG/DL — HIGH (ref 1.6–2.6)
MAGNESIUM SERPL-MCNC: 7.3 MG/DL — CRITICAL HIGH (ref 1.6–2.6)
MCHC RBC-ENTMCNC: 29.1 PG — SIGNIFICANT CHANGE UP (ref 27–34)
MCHC RBC-ENTMCNC: 29.2 PG — SIGNIFICANT CHANGE UP (ref 27–34)
MCHC RBC-ENTMCNC: 33.2 % — SIGNIFICANT CHANGE UP (ref 32–36)
MCHC RBC-ENTMCNC: 33.5 % — SIGNIFICANT CHANGE UP (ref 32–36)
MCV RBC AUTO: 87.1 FL — SIGNIFICANT CHANGE UP (ref 80–100)
MCV RBC AUTO: 87.7 FL — SIGNIFICANT CHANGE UP (ref 80–100)
MONOCYTES # BLD AUTO: 2.28 K/UL — HIGH (ref 0–0.9)
MONOCYTES NFR BLD AUTO: 11.8 % — SIGNIFICANT CHANGE UP (ref 2–14)
NEUTROPHILS # BLD AUTO: 15.16 K/UL — HIGH (ref 1.8–7.4)
NEUTROPHILS NFR BLD AUTO: 78.1 % — HIGH (ref 43–77)
NRBC # FLD: 0.03 K/UL — SIGNIFICANT CHANGE UP (ref 0–0)
NRBC # FLD: 0.05 K/UL — SIGNIFICANT CHANGE UP (ref 0–0)
PLATELET # BLD AUTO: 222 K/UL — SIGNIFICANT CHANGE UP (ref 150–400)
PLATELET # BLD AUTO: 226 K/UL — SIGNIFICANT CHANGE UP (ref 150–400)
PMV BLD: 10.5 FL — SIGNIFICANT CHANGE UP (ref 7–13)
PMV BLD: 10.5 FL — SIGNIFICANT CHANGE UP (ref 7–13)
PROTHROM AB SERPL-ACNC: 9.8 SEC — SIGNIFICANT CHANGE UP (ref 9.8–13.1)
RBC # BLD: 2.71 M/UL — LOW (ref 3.8–5.2)
RBC # BLD: 2.92 M/UL — LOW (ref 3.8–5.2)
RBC # FLD: 14.9 % — HIGH (ref 10.3–14.5)
RBC # FLD: 15.2 % — HIGH (ref 10.3–14.5)
RH IG SCN BLD-IMP: POSITIVE — SIGNIFICANT CHANGE UP
URATE SERPL-MCNC: 6.6 MG/DL — SIGNIFICANT CHANGE UP (ref 2.5–7)
WBC # BLD: 19.4 K/UL — HIGH (ref 3.8–10.5)
WBC # BLD: 24.28 K/UL — HIGH (ref 3.8–10.5)
WBC # FLD AUTO: 19.4 K/UL — HIGH (ref 3.8–10.5)
WBC # FLD AUTO: 24.28 K/UL — HIGH (ref 3.8–10.5)

## 2019-06-12 PROCEDURE — 59400 OBSTETRICAL CARE: CPT | Mod: U7,UB,GC

## 2019-06-12 PROCEDURE — 88307 TISSUE EXAM BY PATHOLOGIST: CPT | Mod: 26

## 2019-06-12 RX ORDER — HEPARIN SODIUM 5000 [USP'U]/ML
5000 INJECTION INTRAVENOUS; SUBCUTANEOUS EVERY 12 HOURS
Refills: 0 | Status: DISCONTINUED | OUTPATIENT
Start: 2019-06-12 | End: 2019-06-12

## 2019-06-12 RX ORDER — DOCUSATE SODIUM 100 MG
100 CAPSULE ORAL
Refills: 0 | Status: DISCONTINUED | OUTPATIENT
Start: 2019-06-12 | End: 2019-06-14

## 2019-06-12 RX ORDER — MAGNESIUM HYDROXIDE 400 MG/1
30 TABLET, CHEWABLE ORAL
Refills: 0 | Status: DISCONTINUED | OUTPATIENT
Start: 2019-06-12 | End: 2019-06-14

## 2019-06-12 RX ORDER — ACETAMINOPHEN 500 MG
3 TABLET ORAL
Qty: 0 | Refills: 0 | DISCHARGE
Start: 2019-06-12

## 2019-06-12 RX ORDER — MAGNESIUM SULFATE 500 MG/ML
1.5 VIAL (ML) INJECTION
Qty: 40 | Refills: 0 | Status: DISCONTINUED | OUTPATIENT
Start: 2019-06-12 | End: 2019-06-12

## 2019-06-12 RX ORDER — SODIUM CHLORIDE 9 MG/ML
1000 INJECTION, SOLUTION INTRAVENOUS
Refills: 0 | Status: DISCONTINUED | OUTPATIENT
Start: 2019-06-12 | End: 2019-06-13

## 2019-06-12 RX ORDER — LABETALOL HCL 100 MG
20 TABLET ORAL ONCE
Refills: 0 | Status: COMPLETED | OUTPATIENT
Start: 2019-06-12 | End: 2019-06-12

## 2019-06-12 RX ORDER — DIPHENOXYLATE HCL/ATROPINE 2.5-.025MG
2 TABLET ORAL ONCE
Refills: 0 | Status: DISCONTINUED | OUTPATIENT
Start: 2019-06-12 | End: 2019-06-12

## 2019-06-12 RX ORDER — OXYTOCIN 10 UNIT/ML
333.33 VIAL (ML) INJECTION
Qty: 20 | Refills: 0 | Status: DISCONTINUED | OUTPATIENT
Start: 2019-06-12 | End: 2019-06-13

## 2019-06-12 RX ORDER — ASCORBIC ACID 60 MG
500 TABLET,CHEWABLE ORAL DAILY
Refills: 0 | Status: DISCONTINUED | OUTPATIENT
Start: 2019-06-12 | End: 2019-06-14

## 2019-06-12 RX ORDER — PRAMOXINE HYDROCHLORIDE 150 MG/15G
1 AEROSOL, FOAM RECTAL EVERY 4 HOURS
Refills: 0 | Status: DISCONTINUED | OUTPATIENT
Start: 2019-06-12 | End: 2019-06-14

## 2019-06-12 RX ORDER — IBUPROFEN 200 MG
600 TABLET ORAL EVERY 6 HOURS
Refills: 0 | Status: DISCONTINUED | OUTPATIENT
Start: 2019-06-12 | End: 2019-06-12

## 2019-06-12 RX ORDER — OXYCODONE HYDROCHLORIDE 5 MG/1
5 TABLET ORAL
Refills: 0 | Status: DISCONTINUED | OUTPATIENT
Start: 2019-06-12 | End: 2019-06-14

## 2019-06-12 RX ORDER — OXYCODONE HYDROCHLORIDE 5 MG/1
5 TABLET ORAL ONCE
Refills: 0 | Status: DISCONTINUED | OUTPATIENT
Start: 2019-06-12 | End: 2019-06-14

## 2019-06-12 RX ORDER — TETANUS TOXOID, REDUCED DIPHTHERIA TOXOID AND ACELLULAR PERTUSSIS VACCINE, ADSORBED 5; 2.5; 8; 8; 2.5 [IU]/.5ML; [IU]/.5ML; UG/.5ML; UG/.5ML; UG/.5ML
0.5 SUSPENSION INTRAMUSCULAR ONCE
Refills: 0 | Status: DISCONTINUED | OUTPATIENT
Start: 2019-06-12 | End: 2019-06-14

## 2019-06-12 RX ORDER — ACETAMINOPHEN 500 MG
975 TABLET ORAL EVERY 6 HOURS
Refills: 0 | Status: DISCONTINUED | OUTPATIENT
Start: 2019-06-12 | End: 2019-06-14

## 2019-06-12 RX ORDER — AER TRAVELER 0.5 G/1
1 SOLUTION RECTAL; TOPICAL EVERY 4 HOURS
Refills: 0 | Status: DISCONTINUED | OUTPATIENT
Start: 2019-06-12 | End: 2019-06-14

## 2019-06-12 RX ORDER — DOCUSATE SODIUM 100 MG
100 CAPSULE ORAL
Refills: 0 | Status: DISCONTINUED | OUTPATIENT
Start: 2019-06-12 | End: 2019-06-12

## 2019-06-12 RX ORDER — CEFAZOLIN SODIUM 1 G
2000 VIAL (EA) INJECTION ONCE
Refills: 0 | Status: COMPLETED | OUTPATIENT
Start: 2019-06-12 | End: 2019-06-12

## 2019-06-12 RX ORDER — SIMETHICONE 80 MG/1
80 TABLET, CHEWABLE ORAL EVERY 4 HOURS
Refills: 0 | Status: DISCONTINUED | OUTPATIENT
Start: 2019-06-12 | End: 2019-06-14

## 2019-06-12 RX ORDER — DIBUCAINE 1 %
1 OINTMENT (GRAM) RECTAL EVERY 6 HOURS
Refills: 0 | Status: DISCONTINUED | OUTPATIENT
Start: 2019-06-12 | End: 2019-06-14

## 2019-06-12 RX ORDER — BENZOCAINE 10 %
1 GEL (GRAM) MUCOUS MEMBRANE EVERY 6 HOURS
Refills: 0 | Status: DISCONTINUED | OUTPATIENT
Start: 2019-06-12 | End: 2019-06-14

## 2019-06-12 RX ORDER — HYDROCORTISONE 1 %
1 OINTMENT (GRAM) TOPICAL EVERY 6 HOURS
Refills: 0 | Status: DISCONTINUED | OUTPATIENT
Start: 2019-06-12 | End: 2019-06-14

## 2019-06-12 RX ORDER — GLYCERIN ADULT
1 SUPPOSITORY, RECTAL RECTAL AT BEDTIME
Refills: 0 | Status: DISCONTINUED | OUTPATIENT
Start: 2019-06-12 | End: 2019-06-14

## 2019-06-12 RX ORDER — LABETALOL HCL 100 MG
40 TABLET ORAL ONCE
Refills: 0 | Status: COMPLETED | OUTPATIENT
Start: 2019-06-12 | End: 2019-06-12

## 2019-06-12 RX ORDER — KETOROLAC TROMETHAMINE 30 MG/ML
30 SYRINGE (ML) INJECTION ONCE
Refills: 0 | Status: DISCONTINUED | OUTPATIENT
Start: 2019-06-12 | End: 2019-06-12

## 2019-06-12 RX ORDER — DIPHENHYDRAMINE HCL 50 MG
25 CAPSULE ORAL EVERY 6 HOURS
Refills: 0 | Status: DISCONTINUED | OUTPATIENT
Start: 2019-06-12 | End: 2019-06-14

## 2019-06-12 RX ORDER — TRANEXAMIC ACID 100 MG/ML
1000 INJECTION, SOLUTION INTRAVENOUS ONCE
Refills: 0 | Status: COMPLETED | OUTPATIENT
Start: 2019-06-12 | End: 2019-06-12

## 2019-06-12 RX ORDER — FERROUS SULFATE 325(65) MG
325 TABLET ORAL THREE TIMES A DAY
Refills: 0 | Status: DISCONTINUED | OUTPATIENT
Start: 2019-06-12 | End: 2019-06-14

## 2019-06-12 RX ORDER — OXYTOCIN 10 UNIT/ML
20 VIAL (ML) INJECTION ONCE
Refills: 0 | Status: COMPLETED | OUTPATIENT
Start: 2019-06-12 | End: 2019-06-12

## 2019-06-12 RX ORDER — CARBOPROST TROMETHAMINE 250 UG/ML
250 INJECTION, SOLUTION INTRAMUSCULAR ONCE
Refills: 0 | Status: COMPLETED | OUTPATIENT
Start: 2019-06-12 | End: 2019-06-12

## 2019-06-12 RX ORDER — MAGNESIUM SULFATE 500 MG/ML
1.5 VIAL (ML) INJECTION
Qty: 40 | Refills: 0 | Status: DISCONTINUED | OUTPATIENT
Start: 2019-06-12 | End: 2019-06-13

## 2019-06-12 RX ORDER — LANOLIN
1 OINTMENT (GRAM) TOPICAL EVERY 6 HOURS
Refills: 0 | Status: DISCONTINUED | OUTPATIENT
Start: 2019-06-12 | End: 2019-06-14

## 2019-06-12 RX ADMIN — Medication 1 TABLET(S): at 12:53

## 2019-06-12 RX ADMIN — Medication 37.5 GM/HR: at 05:32

## 2019-06-12 RX ADMIN — CARBOPROST TROMETHAMINE 250 MICROGRAM(S): 250 INJECTION, SOLUTION INTRAMUSCULAR at 03:37

## 2019-06-12 RX ADMIN — Medication 100 MILLIGRAM(S): at 12:55

## 2019-06-12 RX ADMIN — Medication 975 MILLIGRAM(S): at 13:50

## 2019-06-12 RX ADMIN — Medication 975 MILLIGRAM(S): at 19:20

## 2019-06-12 RX ADMIN — Medication 37.5 GM/HR: at 19:12

## 2019-06-12 RX ADMIN — Medication 37.5 GM/HR: at 09:46

## 2019-06-12 RX ADMIN — Medication 2 TABLET(S): at 03:45

## 2019-06-12 RX ADMIN — Medication 975 MILLIGRAM(S): at 18:23

## 2019-06-12 RX ADMIN — SODIUM CHLORIDE 62.5 MILLILITER(S): 9 INJECTION, SOLUTION INTRAVENOUS at 09:30

## 2019-06-12 RX ADMIN — Medication 100 MILLIGRAM(S): at 05:36

## 2019-06-12 RX ADMIN — Medication 20 UNIT(S): at 03:57

## 2019-06-12 RX ADMIN — Medication 325 MILLIGRAM(S): at 12:52

## 2019-06-12 RX ADMIN — Medication 40 MILLIGRAM(S): at 01:41

## 2019-06-12 RX ADMIN — TRANEXAMIC ACID 220 MILLIGRAM(S): 100 INJECTION, SOLUTION INTRAVENOUS at 03:48

## 2019-06-12 RX ADMIN — Medication 20 MILLIUNIT(S)/MIN: at 00:26

## 2019-06-12 RX ADMIN — Medication 500 MILLIGRAM(S): at 12:53

## 2019-06-12 RX ADMIN — Medication 975 MILLIGRAM(S): at 12:53

## 2019-06-12 RX ADMIN — Medication 20 MILLIGRAM(S): at 00:19

## 2019-06-12 RX ADMIN — Medication 1000 MILLIUNIT(S)/MIN: at 04:34

## 2019-06-12 RX ADMIN — Medication 325 MILLIGRAM(S): at 23:06

## 2019-06-12 NOTE — OB RN DELIVERY SUMMARY - NS_LABORCHARACTER_OBGYN_ALL_OB
Induction of labor-AROM/Augmentation of labor Induction of labor-AROM/Augmentation of labor/External electronic FM

## 2019-06-12 NOTE — DISCHARGE NOTE OB - CARE PLAN
Principal Discharge DX:	Vaginal delivery  Goal:	Recovery  Assessment and plan of treatment:	Regular  Secondary Diagnosis:	Preeclampsia, severe, third trimester  Goal:	Recovery  Assessment and plan of treatment:	Blood pressure checks  Secondary Diagnosis:	Third-stage postpartum hemorrhage  Goal:	Recovery Principal Discharge DX:	Vacuum extractor delivery, delivered  Goal:	Recovery  Assessment and plan of treatment:	Regular  Secondary Diagnosis:	Preeclampsia, severe, third trimester  Goal:	Recovery  Assessment and plan of treatment:	Blood pressure checks  Secondary Diagnosis:	Third-stage postpartum hemorrhage  Goal:	Recovery

## 2019-06-12 NOTE — DISCHARGE NOTE OB - MEDICATION SUMMARY - MEDICATIONS TO TAKE
I will START or STAY ON the medications listed below when I get home from the hospital:    acetaminophen 325 mg oral tablet  -- 3 tab(s) by mouth every 6 hours  -- Indication: For Vaginal delivery

## 2019-06-12 NOTE — DISCHARGE NOTE OB - PATIENT PORTAL LINK FT
You can access the [facility], offered by [facility], by registering with the following website: [email or web address]

## 2019-06-12 NOTE — OB RN DELIVERY SUMMARY - NS_RNDELIVATTEST_OBGYN_ALL_OB
OB Provider reported that the vagina was inspected and no foreign body was found OB Provider reported that the vagina was inspected and no foreign body was found/Laps, needles and instrument count was correct

## 2019-06-12 NOTE — DISCHARGE NOTE OB - CARE PROVIDER_API CALL
Karlee Ventrua (MD)  Obstetrics and Gynecology  [facility] [place] [address]  Phone: [phone]  Fax: [phone]  Follow Up Time:

## 2019-06-12 NOTE — OB PROVIDER DELIVERY SUMMARY - NSPROVIDERDELIVERYNOTE_OBGYN_ALL_OB_FT
viable male infant delivered over intact perineum via Vacuum-assistance, nuchal cord x1.  Placenta noted to be fully retained after 30min and was manually removed in its entirety. Subsequent atony and PPH treated with rectal cytotec, IM Hemabate, Pitocin and TXA.

## 2019-06-12 NOTE — OB RN DELIVERY SUMMARY - AS DELIV COMPLICATIONS OB
pre eclampsia/abnormal second phase labor retained placenta/pre eclampsia/abnormal second phase labor

## 2019-06-12 NOTE — DISCHARGE NOTE OB - HOSPITAL COURSE
Pt admitted at 35+wks for induction of labor for severe preeclampsia. She received Magnesium sulfate and induction agents and advanced to second stage and a viable male infant delivered via VAVD, nuchal cord x1.  Placenta noted to be fully retained after 30min and was manually removed in its entirety. Subsequent atony and PPH treated with rectal cytotec, IM Hemabate, Pitocin and TXA.  She recovered well PP.

## 2019-06-12 NOTE — OB NEONATOLOGY/PEDIATRICIAN DELIVERY SUMMARY - NSPEDSNEONOTESA_OBGYN_ALL_OB_FT
Baby is a 36.0 week GA M born to a 27 y/o G 3  mother via VaVD with cat II tracing and terminal meconium. Maternal history uncomplicated. Pregnancy complicated by superimposed pre eclampsia, for which she was induced. She receive beta on 6/8 and 6/9, and was started on Mg on 6/8. She received 7 doses of amp until GBS came back negative, last dose 1900 on 6/9. Maternal blood type O+. Prenatal labs neg/NR/imm. GBS neg on 6/7. AROM at 0750 on 6/11 with clear fluid and terminal mec during delivery, rom 19hrs. Baby born vigorous and crying spontaneously. Warmed, dried, stimulated. Apgars 9 / 9. EOS 0.72. Breast and bottle feed, consent to hep b and to circ.

## 2019-06-12 NOTE — DISCHARGE NOTE OB - MATERIALS PROVIDED
Shaken Baby Prevention Handout/Breastfeeding Guide and Packet/Discharge Medication Information for Patients and Families Pocket Guide/Vaccinations/[facility] Hearing Screen Program/Birth Certificate Instructions/Breastfeeding Log/MMR Vaccination (VIS Pub Date: 2012)/Tdap Vaccination (VIS Pub Date: 2012)/[facility]  Screening Program/Bottle Feeding Log/  Immunization Record/Breastfeeding Mother’s Support Group Information/Guide to Postpartum Care

## 2019-06-13 ENCOUNTER — APPOINTMENT (OUTPATIENT)
Dept: OBGYN | Facility: CLINIC | Age: 29
End: 2019-06-13

## 2019-06-13 ENCOUNTER — OTHER (OUTPATIENT)
Age: 29
End: 2019-06-13

## 2019-06-13 LAB
ANISOCYTOSIS BLD QL: SLIGHT — SIGNIFICANT CHANGE UP
BASOPHILS # BLD AUTO: 0.08 K/UL — SIGNIFICANT CHANGE UP (ref 0–0.2)
BASOPHILS NFR BLD AUTO: 0.4 % — SIGNIFICANT CHANGE UP (ref 0–2)
BASOPHILS NFR SPEC: 0 % — SIGNIFICANT CHANGE UP (ref 0–2)
BLASTS # FLD: 0 % — SIGNIFICANT CHANGE UP (ref 0–0)
EOSINOPHIL # BLD AUTO: 0.12 K/UL — SIGNIFICANT CHANGE UP (ref 0–0.5)
EOSINOPHIL NFR BLD AUTO: 0.6 % — SIGNIFICANT CHANGE UP (ref 0–6)
EOSINOPHIL NFR FLD: 0.8 % — SIGNIFICANT CHANGE UP (ref 0–6)
GIANT PLATELETS BLD QL SMEAR: PRESENT — SIGNIFICANT CHANGE UP
HCT VFR BLD CALC: 20.1 % — CRITICAL LOW (ref 34.5–45)
HCT VFR BLD CALC: 21.2 % — LOW (ref 34.5–45)
HGB BLD-MCNC: 6.6 G/DL — CRITICAL LOW (ref 11.5–15.5)
HGB BLD-MCNC: 6.9 G/DL — CRITICAL LOW (ref 11.5–15.5)
HYPOCHROMIA BLD QL: SLIGHT — SIGNIFICANT CHANGE UP
IMM GRANULOCYTES NFR BLD AUTO: 0.9 % — SIGNIFICANT CHANGE UP (ref 0–1.5)
LYMPHOCYTES # BLD AUTO: 25.6 % — SIGNIFICANT CHANGE UP (ref 13–44)
LYMPHOCYTES # BLD AUTO: 5.24 K/UL — HIGH (ref 1–3.3)
LYMPHOCYTES NFR SPEC AUTO: 29.1 % — SIGNIFICANT CHANGE UP (ref 13–44)
MAGNESIUM SERPL-MCNC: 5.5 MG/DL — HIGH (ref 1.6–2.6)
MCHC RBC-ENTMCNC: 29.2 PG — SIGNIFICANT CHANGE UP (ref 27–34)
MCHC RBC-ENTMCNC: 29.5 PG — SIGNIFICANT CHANGE UP (ref 27–34)
MCHC RBC-ENTMCNC: 32.5 % — SIGNIFICANT CHANGE UP (ref 32–36)
MCHC RBC-ENTMCNC: 32.8 % — SIGNIFICANT CHANGE UP (ref 32–36)
MCV RBC AUTO: 89.7 FL — SIGNIFICANT CHANGE UP (ref 80–100)
MCV RBC AUTO: 89.8 FL — SIGNIFICANT CHANGE UP (ref 80–100)
METAMYELOCYTES # FLD: 0 % — SIGNIFICANT CHANGE UP (ref 0–1)
MONOCYTES # BLD AUTO: 1.38 K/UL — HIGH (ref 0–0.9)
MONOCYTES NFR BLD AUTO: 6.7 % — SIGNIFICANT CHANGE UP (ref 2–14)
MONOCYTES NFR BLD: 4.3 % — SIGNIFICANT CHANGE UP (ref 2–9)
MYELOCYTES NFR BLD: 0.9 % — HIGH (ref 0–0)
NEUTROPHIL AB SER-ACNC: 60.7 % — SIGNIFICANT CHANGE UP (ref 43–77)
NEUTROPHILS # BLD AUTO: 13.47 K/UL — HIGH (ref 1.8–7.4)
NEUTROPHILS NFR BLD AUTO: 65.8 % — SIGNIFICANT CHANGE UP (ref 43–77)
NEUTS BAND # BLD: 0.8 % — SIGNIFICANT CHANGE UP (ref 0–6)
NRBC # FLD: 0.05 K/UL — SIGNIFICANT CHANGE UP (ref 0–0)
NRBC # FLD: 0.06 K/UL — SIGNIFICANT CHANGE UP (ref 0–0)
OTHER - HEMATOLOGY %: 0 — SIGNIFICANT CHANGE UP
PLATELET # BLD AUTO: 213 K/UL — SIGNIFICANT CHANGE UP (ref 150–400)
PLATELET # BLD AUTO: 235 K/UL — SIGNIFICANT CHANGE UP (ref 150–400)
PLATELET COUNT - ESTIMATE: NORMAL — SIGNIFICANT CHANGE UP
PMV BLD: 10.7 FL — SIGNIFICANT CHANGE UP (ref 7–13)
PMV BLD: 10.8 FL — SIGNIFICANT CHANGE UP (ref 7–13)
PROMYELOCYTES # FLD: 0 % — SIGNIFICANT CHANGE UP (ref 0–0)
RBC # BLD: 2.24 M/UL — LOW (ref 3.8–5.2)
RBC # BLD: 2.36 M/UL — LOW (ref 3.8–5.2)
RBC # FLD: 15.2 % — HIGH (ref 10.3–14.5)
RBC # FLD: 15.3 % — HIGH (ref 10.3–14.5)
VARIANT LYMPHS # BLD: 3.4 % — SIGNIFICANT CHANGE UP
WBC # BLD: 20.48 K/UL — HIGH (ref 3.8–10.5)
WBC # BLD: 21.15 K/UL — HIGH (ref 3.8–10.5)
WBC # FLD AUTO: 20.48 K/UL — HIGH (ref 3.8–10.5)
WBC # FLD AUTO: 21.15 K/UL — HIGH (ref 3.8–10.5)

## 2019-06-13 RX ADMIN — Medication 100 MILLIGRAM(S): at 17:31

## 2019-06-13 RX ADMIN — Medication 975 MILLIGRAM(S): at 05:01

## 2019-06-13 RX ADMIN — Medication 100 MILLIGRAM(S): at 05:07

## 2019-06-13 RX ADMIN — Medication 975 MILLIGRAM(S): at 17:31

## 2019-06-13 RX ADMIN — Medication 1 TABLET(S): at 11:56

## 2019-06-13 RX ADMIN — Medication 975 MILLIGRAM(S): at 12:45

## 2019-06-13 RX ADMIN — Medication 325 MILLIGRAM(S): at 23:23

## 2019-06-13 RX ADMIN — Medication 975 MILLIGRAM(S): at 23:23

## 2019-06-13 RX ADMIN — Medication 975 MILLIGRAM(S): at 05:31

## 2019-06-13 RX ADMIN — Medication 975 MILLIGRAM(S): at 18:18

## 2019-06-13 RX ADMIN — Medication 500 MILLIGRAM(S): at 11:56

## 2019-06-13 RX ADMIN — Medication 975 MILLIGRAM(S): at 11:55

## 2019-06-13 RX ADMIN — Medication 975 MILLIGRAM(S): at 23:52

## 2019-06-13 RX ADMIN — Medication 325 MILLIGRAM(S): at 05:07

## 2019-06-13 RX ADMIN — Medication 325 MILLIGRAM(S): at 14:32

## 2019-06-13 NOTE — PROGRESS NOTE ADULT - SUBJECTIVE AND OBJECTIVE BOX
POD#1 S/P Vaginal Delivery with epidural anesthesia    Patient is doing well.  OOB,AA. Tolerating clears.  Pain is tolerable.  No residual anesthetic issues or complications noted.    Jaun Jackson CRNA

## 2019-06-13 NOTE — LACTATION INITIAL EVALUATION - LACTATION INTERVENTIONS
initiate dual electric pump routine/Instructed with positioning to facilitate deep latch.  Infant has been under phototherapy throughout the day.  Patient has been dual electric pumping and staff has been feeding expressed milk along with formula.  Encouraged patient to initiate breastfeeding infant this evening with assistance of RN.  Positioning and feeding cues discussed, along with feeding patterns of late  infant.  Feeding log reviewed.  Hand expression taught.  Discussed outpatient resources available, warm line, breastfeeding support group.  Encouraged to call for assistance, as needed.  Primary RN made aware./initiate skin to skin/initiate hand expression routine

## 2019-06-13 NOTE — PROGRESS NOTE ADULT - SUBJECTIVE AND OBJECTIVE BOX
postpartum day 1  pt seen and evaluated by me , s/p SO with retained placenta requiring manual removal with subsequent PPH of 1500cc. requiring TXA , Cytotec adn Hemabate Hgb. 11.4> 8.5 doing well, no complaints, continue routine postpartum care.

## 2019-06-13 NOTE — PROGRESS NOTE ADULT - PROBLEM SELECTOR PLAN 1
- Anemia 2/2 acute blood loss with delivery, hct stable 25.6->23.6, VSS  - BPs well controlled   - No s/s PEC   - Pain well controlled, continue current pain regimen  - Increase ambulation, SCDs when not ambulating  - Continue regular diet    Shannan De La O D.O. PGY2

## 2019-06-13 NOTE — PROGRESS NOTE ADULT - SUBJECTIVE AND OBJECTIVE BOX
OB Progress Note: VAVD PPD#1    S: 29yo PPD#1 s/p VAVD. Patient feels well. Pain is well controlled. She is tolerating a regular diet and passing flatus. She is voiding spontaneously, and ambulating without difficulty. Denies CP/SOB. Denies lightheadedness/dizziness. Denies N/V. Denies HA, blurry vision, RUQ/epigastric pain, new onset swelling.     O:  Vitals:  Vital Signs Last 24 Hrs  T(C): 36.8 (13 Jun 2019 04:00), Max: 36.9 (12 Jun 2019 20:00)  T(F): 98.2 (13 Jun 2019 04:00), Max: 98.4 (12 Jun 2019 20:00)  HR: 79 (13 Jun 2019 04:00) (66 - 99)  BP: 141/68 (13 Jun 2019 04:00) (120/63 - 142/68)  BP(mean): 82 (13 Jun 2019 04:00) (82 - 87)  RR: 20 (13 Jun 2019 04:00) (16 - 20)  SpO2: 100% (13 Jun 2019 04:00) (97% - 100%)    MEDICATIONS  (STANDING):  acetaminophen   Tablet .. 975 milliGRAM(s) Oral every 6 hours  ascorbic acid 500 milliGRAM(s) Oral daily  diphtheria/tetanus/pertussis (acellular) Vaccine (ADAcel) 0.5 milliLiter(s) IntraMuscular once  docusate sodium 100 milliGRAM(s) Oral two times a day  ferrous    sulfate 325 milliGRAM(s) Oral three times a day  prenatal multivitamin 1 Tablet(s) Oral daily      Labs:  Blood type: O Positive  Rubella IgG: RPR: Negative                          7.9<L>   24.28<H> >-----------< 226    ( 06-12 @ 13:00 )             23.6<L>                        8.5<L>   19.40<H> >-----------< 222    ( 06-12 @ 06:00 )             25.6<L>                        11.4<L>   11.33<H> >-----------< 266    ( 06-11 @ 10:45 )             34.2<L>                        10.6<L>   10.72<H> >-----------< 244    ( 06-10 @ 23:15 )             31.9<L>                        11.6   12.15<H> >-----------< 268    ( 06-10 @ 10:27 )             34.4<L>    06-11-19 @ 10:45      139  |  103  |  11  ----------------------------<  89  3.9   |  22  |  0.64    06-10-19 @ 23:15      136  |  102  |  12  ----------------------------<  134<H>  4.1   |  22  |  0.73    06-10-19 @ 10:28      133<L>  |  98  |  10  ----------------------------<  118<H>  3.7   |  20<L>  |  0.61        Ca    7.5<L>      11 Jun 2019 10:45  Ca    6.9<L>      10 Boby 2019 23:15  Ca    7.0<L>      10 Boby 2019 10:28  Mg     5.5<H>     06-13  Mg     5.8<H>     06-12  Mg     5.9<H>     06-12  Mg     5.6<H>     06-12  Mg     7.3<HH>     06-11  Mg     6.3<H>     06-11  Mg     6.8<H>     06-11  Mg     6.2<H>     06-11  Mg     6.4<H>     06-10  Mg     6.7<H>     06-10  Mg     6.5<H>     06-10    TPro  6.9  /  Alb  3.5  /  TBili  < 0.2<L>  /  DBili  x   /  AST  28  /  ALT  20  /  AlkPhos  162<H>  06-11-19 @ 10:45  TPro  6.5  /  Alb  3.4  /  TBili  < 0.2<L>  /  DBili  x   /  AST  26  /  ALT  18  /  AlkPhos  152<H>  06-10-19 @ 23:15  TPro  6.9  /  Alb  3.5  /  TBili  < 0.2<L>  /  DBili  x   /  AST  24  /  ALT  17  /  AlkPhos  161<H>  06-10-19 @ 10:28          Physical Exam:  General: NAD  Abdomen: soft, non-tender, non-distended, fundus firm  Vaginal: Lochia wnl  Extremities: NTBL

## 2019-06-14 VITALS
RESPIRATION RATE: 18 BRPM | TEMPERATURE: 99 F | DIASTOLIC BLOOD PRESSURE: 78 MMHG | SYSTOLIC BLOOD PRESSURE: 145 MMHG | OXYGEN SATURATION: 100 % | HEART RATE: 83 BPM

## 2019-06-14 RX ADMIN — Medication 100 MILLIGRAM(S): at 05:32

## 2019-06-14 RX ADMIN — Medication 1 TABLET(S): at 11:45

## 2019-06-14 RX ADMIN — Medication 975 MILLIGRAM(S): at 06:06

## 2019-06-14 RX ADMIN — Medication 975 MILLIGRAM(S): at 12:15

## 2019-06-14 RX ADMIN — Medication 975 MILLIGRAM(S): at 11:45

## 2019-06-14 RX ADMIN — Medication 325 MILLIGRAM(S): at 05:32

## 2019-06-14 RX ADMIN — Medication 975 MILLIGRAM(S): at 05:31

## 2019-06-14 NOTE — PROGRESS NOTE ADULT - PROBLEM SELECTOR PLAN 1
- Pain well controlled, continue current pain regimen  - BPs controlled with labetalol 300tid which will be continued postpartum. no s/s PEC   - Hemodynamically stable   - Increase ambulation, SCDs when not ambulating  - Continue regular diet  - Discharge planning     Jose Luis PGY2

## 2019-06-14 NOTE — PROGRESS NOTE ADULT - ASSESSMENT
A/P: 27yo PPD#2 s/p VAVD complicated by sPEC status post MgSO4 with manual extraction of placenta.  Patient is stable and doing well post-partum

## 2019-06-14 NOTE — PROGRESS NOTE ADULT - SUBJECTIVE AND OBJECTIVE BOX
OB Progress Note:  PPD#2    S: 27yo PPD#2 s/p VAVD. Patient feels well. Pain is well controlled. She is tolerating a regular diet and passing flatus. She is voiding spontaneously, and ambulating without difficulty. Denies CP/SOB. Denies lightheadedness/dizziness. Denies N/V. Denies HA, blurry vision, RUQ/epigastric pain, new onset swelling.     O:  Vitals:   Vital Signs Last 24 Hrs  T(C): 36.8 (2019 06:00), Max: 37.1 (2019 17:15)  T(F): 98.3 (2019 06:00), Max: 98.7 (2019 17:15)  HR: 75 (2019 06:00) (75 - 86)  BP: 148/81 (2019 06:00) (137/75 - 149/69)  BP(mean): --  RR: 18 (2019 06:00) (18 - 19)  SpO2: 100% (2019 06:00) (99% - 100%)    MEDICATIONS  (STANDING):  acetaminophen   Tablet .. 975 milliGRAM(s) Oral every 6 hours  ascorbic acid 500 milliGRAM(s) Oral daily  diphtheria/tetanus/pertussis (acellular) Vaccine (ADAcel) 0.5 milliLiter(s) IntraMuscular once  docusate sodium 100 milliGRAM(s) Oral two times a day  ferrous    sulfate 325 milliGRAM(s) Oral three times a day  prenatal multivitamin 1 Tablet(s) Oral daily    MEDICATIONS  (PRN):  benzocaine 20%/menthol 0.5% Spray 1 Spray(s) Topical every 6 hours PRN for Perineal discomfort  dibucaine 1% Ointment 1 Application(s) Topical every 6 hours PRN Perineal discomfort  diphenhydrAMINE 25 milliGRAM(s) Oral every 6 hours PRN Pruritus  glycerin Suppository - Adult 1 Suppository(s) Rectal at bedtime PRN Constipation  hydrocortisone 1% Cream 1 Application(s) Topical every 6 hours PRN Moderate Pain (4-6)  lanolin Ointment 1 Application(s) Topical every 6 hours PRN nipple soreness  magnesium hydroxide Suspension 30 milliLiter(s) Oral two times a day PRN Constipation  oxyCODONE    IR 5 milliGRAM(s) Oral every 3 hours PRN Moderate Pain (4 - 6)  oxyCODONE    IR 5 milliGRAM(s) Oral once PRN Severe Pain (7 -10)  pramoxine 1%/zinc 5% Cream 1 Application(s) Topical every 4 hours PRN Moderate Pain (4-6)  simethicone 80 milliGRAM(s) Chew every 4 hours PRN Gas  witch hazel Pads 1 Application(s) Topical every 4 hours PRN Perineal discomfort      Labs:  Blood type: O Positive  Rubella IgG: RPR: Negative                          6.9<LL>   20.48<H> >-----------< 235    (  @ 12:00 )             21.2<L>                        6.6<LL>   21.15<H> >-----------< 213    (  @ 08:03 )             20.1<LL>                        7.9<L>   24.28<H> >-----------< 226    (  @ 13:00 )             23.6<L>                        8.5<L>   19.40<H> >-----------< 222    (  @ 06:00 )             25.6<L>                        11.4<L>   11.33<H> >-----------< 266    (  @ 10:45 )             34.2<L>    19 @ 10:45      139  |  103  |  11  ----------------------------<  89  3.9   |  22  |  0.64        Ca    7.5<L>      2019 10:45  Mg     5.5<H>     06-13  Mg     5.8<H>     06-12  Mg     5.9<H>     06-12  Mg     5.6<H>     06-12  Mg     7.3<HH>     -11  Mg     6.3<H>     06-11  Mg     6.8<H>         TPro  6.9  /  Alb  3.5  /  TBili  < 0.2<L>  /  DBili  x   /  AST  28  /  ALT  20  /  AlkPhos  162<H>  19 @ 10:45          Physical Exam:  General: NAD  Abdomen: soft, non-tender, non-distended, fundus firm  Vaginal: Lochia wnl  Extremities: No erythema/edema

## 2019-06-20 ENCOUNTER — APPOINTMENT (OUTPATIENT)
Dept: OBGYN | Facility: CLINIC | Age: 29
End: 2019-06-20

## 2019-06-20 LAB — SURGICAL PATHOLOGY STUDY: SIGNIFICANT CHANGE UP

## 2019-06-24 ENCOUNTER — RESULT REVIEW (OUTPATIENT)
Age: 29
End: 2019-06-24

## 2019-06-27 ENCOUNTER — APPOINTMENT (OUTPATIENT)
Dept: OBGYN | Facility: CLINIC | Age: 29
End: 2019-06-27

## 2019-07-03 ENCOUNTER — APPOINTMENT (OUTPATIENT)
Dept: OBGYN | Facility: CLINIC | Age: 29
End: 2019-07-03

## 2019-07-08 ENCOUNTER — APPOINTMENT (OUTPATIENT)
Dept: OBGYN | Facility: CLINIC | Age: 29
End: 2019-07-08

## 2019-07-10 DIAGNOSIS — O13.3 GESTATIONAL [PREGNANCY-INDUCED] HYPERTENSION WITHOUT SIGNIFICANT PROTEINURIA, THIRD TRIMESTER: ICD-10-CM

## 2019-07-10 DIAGNOSIS — O09.813 SUPERVISION OF PREGNANCY RESULTING FROM ASSISTED REPRODUCTIVE TECHNOLOGY, THIRD TRIMESTER: ICD-10-CM

## 2019-07-10 DIAGNOSIS — O36.63X0 MATERNAL CARE FOR EXCESSIVE FETAL GROWTH, THIRD TRIMESTER, NOT APPLICABLE OR UNSPECIFIED: ICD-10-CM

## 2019-07-25 ENCOUNTER — APPOINTMENT (OUTPATIENT)
Dept: OBGYN | Facility: CLINIC | Age: 29
End: 2019-07-25
Payer: COMMERCIAL

## 2019-07-25 VITALS
BODY MASS INDEX: 31.24 KG/M2 | WEIGHT: 206.13 LBS | SYSTOLIC BLOOD PRESSURE: 110 MMHG | DIASTOLIC BLOOD PRESSURE: 69 MMHG | HEART RATE: 67 BPM | HEIGHT: 68 IN

## 2019-07-25 PROCEDURE — 0503F POSTPARTUM CARE VISIT: CPT

## 2019-09-10 NOTE — HISTORY OF PRESENT ILLNESS
[Postpartum Follow Up] : postpartum follow up [Complications:___] : complications include: [unfilled] [Delivery Date: ___] : on [unfilled] [Male] : Delivery History: baby boy [Wt. ___] : weighing [unfilled] [Rubella Vaccine] : Rubella vaccine administered [Pertussis Vaccine] : Pertussis vaccine administered [Boy] : baby is a boy [Infant's Name ___] : [unfilled] [___ Lbs] : [unfilled] lbs [___ Oz] : [unfilled] oz [Circumcised] : circumcised [Living at Home] : is currently living at home [Bottle Feeding] : bottle feeding [Breastfeeding] : currently nursing [Resumed Menses] : has resumed her menses [] : delivered by vaginal delivery [BTL] : no tubal ligation [BF with Difficulty] : nursing without difficulty [Resumed [place]] : has not resumed intercourse [Intended Contraception] : the patient does not intended to use contraception postpartum [S/Sx PP Depression] : no signs/symptoms of postpartum depression [Erythema] : not erythematous [Back to Normal] : is back to normal in size [Mild] : mild vaginal bleeding [Normal] : the vagina was normal [Cervix Sample Taken] : cervical sample not taken for a Pap smear [Not Done] : Examination of breasts not done [Doing Well] : is doing well [No Sign of Infection] : is showing no signs of infection [Excellent Pain Control] : has excellent pain control [None] : None [[email or web address]] : c/o rectal pain

## 2020-03-19 NOTE — OB PROVIDER DELIVERY SUMMARY - NS5000UFH_OBGYN_ALL_OB
In regards to fluid restriction:  Yes, may d/c d/t resident self-compliance   5000u UFH BID should be considered with PPH evaluation (if two or more selected above)

## 2020-12-08 NOTE — OB PROVIDER TRIAGE NOTE - ABORTIONS, OB PROFILE
Hi Barbara, can we please get Fiona to colorectal soon (within 2 to 3 days if possible)  Vandana Castillo MD  [facility]  [phone]     2

## 2020-12-16 NOTE — OB RN PATIENT PROFILE - BILL OF RIGHTS/ADMISSION INFORMATION PROVIDED TO:
Patient : Eder Bernard Age: 60 year old Sex: male   MRN: [id number] Encounter Date: 12/16/2020      History     Chief Complaint   Patient presents with   • Altered Mental Status     HPI  59 yo male with past medical history of hypercholesterolemia who presents to the emergency department secondary to syncopal episode and altered mental status.  Per paramedics patient was found lying on sidewalk with noted broken glasses.  Patient was alert and oriented x 0 on their arrival but improved en route to the emergency department.  Patient is alert and oriented x3 at this time with report of mild frontal headache.  He recalls going to an appointment at 9 am this morning to meet with [facility].  And notes waking up in the ambulance.  He was told that there was ice on the ground but does not recall falling.  Patient is not currently on blood thinners.  He has taken aspirin in the distant past but not currently.        No Known Allergies    Current Discharge Medication List      Prior to Admission Medications    Details   simvastatin (ZOCOR) 20 MG tablet Take 1 tablet by mouth at bedtime.  Qty: 90 tablet, Refills: 3      Multiple Vitamins-Minerals (MULTIVITAMIN ADULT PO)       sildenafil (Revatio) 20 MG tablet Take 2-5 tablets by mouth daily as needed (Erectile dysfunction). Take 0.5-4 hours prior to sexual activity  Qty: 60 tablet, Refills: 3      omeprazole (PrilOSEC) 20 MG capsule Take 1 capsule by mouth daily.  Qty: 30 capsule, Refills: 3      scopolamine (TRANSDERM_SCOP) 1 MG/3DAYS patch Place 1 patch onto the skin every 72 hours.  Qty: 10 patch, Refills: 1             Past Medical History:   Diagnosis Date   • Adenomatous colon polyp 2010   • Diverticulosis    • ED (erectile dysfunction)    • Hemorrhoids    • HLD (hyperlipidemia)    • IFG (impaired fasting glucose)    • BRYANNA on CPAP        Past Surgical History:   Procedure Laterality Date   • COLONOSCOPY  02/22/2016    HFM Dr Pang; preventive screening exam in a  patient with known history of adenomatous polyps; rare diverticulosis of the left colon, hemorrhoids.  Followup exam in 5-10 years.       Family History   Problem Relation Age of Onset   • Hypertension Brother    • Cancer, Lung Father    • Patient is unaware of any medical problems Sister        Social History     Tobacco Use   • Smoking status: Former Smoker     Packs/day: 0.00     Types: Cigarettes     Quit date: 1978     Years since quittin.9   • Smokeless tobacco: Never Used   • Tobacco comment: start in , quit in    Substance Use Topics   • Alcohol use: Yes     Alcohol/week: 6.0 standard drinks     Types: 6 Standard drinks or equivalent per week   • Drug use: No       E-cigarette/Vaping   • E-Cigarette/Vaping Use Never User    • Passive Exposure No    • Counseling Given No      E-Cigarette/Vaping Substances & Devices       Review of Systems  Constitutional:  Negative for fever and chills.  HENT:  Negative for hearing loss.   Eyes:  Negative for visual disturbance.  Respiratory:  Negative for shortness of breath.   Cardiovascular:  Negative for chest pain.  Gastrointestinal:  Negative for abdominal pain.  Endocrine:  Negative for polydipsia and polyuria.  Genitourinary:  Negative for dysuria.  Allergic/Immunologic:  Negative for sneezing.  Neurological:  Negative for paresthesias nor focal weakness  Hematological:  Does not bruise/bleed easily.  Psychiatric/Behavioral:  Negative for agitation.  All other systems reviewed and are negative.    Physical Exam     ED Triage Vitals [20 1000]   ED Triage Vitals Group      Temp 97.9 °F (36.6 °C)      Heart Rate 88      Resp 18      BP (!) 190/116      SpO2 99 %      EtCO2 mmHg       Height 5' 10\" (1.778 m)      Weight 240 lb 15.4 oz (109.3 kg)      Weight Scale Used Scale in bed      BMI (Calculated) 34.57      IBW/kg (Calculated) 73       Physical Exam  Constitutional:  Vital signs reviewed.  Normal development and Body mass index is 34.57  kg/m²., not acutely ill or toxic.   Eyes:  Pupils equal, EOMs normal, conjunctivae normal.  Ears/Nose/Throat/Mouth:  Clear, oral and nasal membranes moist.   Neck:  Trachea midline  Cardiovascular:  Regular rhythm, no murmur/gallop/rub.   Respiratory:  Equal breath sounds, no wheezes/rales/rhonchi, no use of accessory muscles, normal respiratory effort.  Abdomen/Gastrointestinal:  Soft, nondistended, nontender, no hepatomegaly appreciated.  Back:  Normal inspection  Extremities/Musculoskeletal:  No edema, no calf tenderness.  Skin:  Warm, dry, no rash.   Neurologic:  Alert and oriented x 3, cranial nerves grossly intact, moves all four extremities symmetrically with adequate strength. Normal coordination.  Normal speech.  5/5 strength.  Normal sensory.  Normal bilateral finger to nose testing.    Psychiatric:  Normal mood and insight.    ED Course     Procedures    Lab Results     Results for orders placed or performed during the hospital encounter of 12/16/20   Comprehensive Metabolic Panel   Result Value Ref Range    Fasting Status      Sodium 140 135 - 145 mmol/L    Potassium 3.7 3.4 - 5.1 mmol/L    Chloride 105 98 - 107 mmol/L    Carbon Dioxide 27 21 - 32 mmol/L    Anion Gap 12 10 - 20 mmol/L    Glucose 115 (H) 65 - 99 mg/dL    BUN 18 6 - 20 mg/dL    Creatinine 0.94 0.67 - 1.17 mg/dL    Glomerular Filtration Rate 87 (L) >90 mL/min/1.73m2    BUN/ Creatinine Ratio 19 7 - 25    Calcium 9.3 8.4 - 10.2 mg/dL    Bilirubin, Total 0.6 0.2 - 1.0 mg/dL    GOT/AST 25 <=37 Units/L    GPT/ALT 40 <64 Units/L    Alkaline Phosphatase 106 45 - 117 Units/L    Albumin 4.3 3.6 - 5.1 g/dL    Protein, Total 7.8 6.4 - 8.2 g/dL    Globulin 3.5 2.0 - 4.0 g/dL    A/G Ratio 1.2 1.0 - 2.4   Prothrombin Time   Result Value Ref Range    Prothrombin Time 10.2 9.7 - 11.8 sec    INR 1.0 <=5.0 sec   Partial Thromboplastin Time   Result Value Ref Range    PTT 26 22 - 30 sec   Troponin I Ultra Sensitive   Result Value Ref Range    Troponin I,  Ultra Sensitive <0.02 <=0.04 ng/mL   CBC with Automated Differential (performable only)   Result Value Ref Range    WBC 6.3 4.2 - 11.0 K/mcL    RBC 5.17 4.50 - 5.90 mil/mcL    HGB 15.9 13.0 - 17.0 g/dL    HCT 46.6 39.0 - 51.0 %    MCV 90.1 78.0 - 100.0 fl    MCH 30.8 26.0 - 34.0 pg    MCHC 34.1 32.0 - 36.5 g/dL    RDW-CV 12.5 11.0 - 15.0 %     140 - 450 K/mcL    NRBC 0 <=0 /100 WBC    Neutrophil, Percent 52 %    Lymphocytes, Percent 34 %    Mono, Percent 11 %    Eosinophils, Percent 2 %    Basophils, Percent 1 %    Immature Granulocytes 0 %    Absolute Neutrophils 3.3 1.8 - 7.7 K/mcL    Absolute Lymphocytes 2.2 1.0 - 4.0 K/mcL    Absolute Monocytes 0.7 0.3 - 0.9 K/mcL    Absolute Eosinophils  0.1 0.0 - 0.5 K/mcL    Absolute Basophils 0.1 0.0 - 0.3 K/mcL    Absolute Immmature Granulocytes 0.0 0.0 - 0.2 K/mcL    RDW-SD 41.3 39.0 - 50.0 fL   GLUCOSE, BEDSIDE - POINT OF CARE   Result Value Ref Range    GLUCOSE, BEDSIDE - POINT OF CARE 88 70 - 99 mg/dL   ISTAT8 VENOUS  POINT OF CARE   Result Value Ref Range    BUN - POINT OF CARE 20 6 - 20 mg/dL    SODIUM - POINT OF CARE 141 135 - 145 mmol/L    POTASSIUM - POINT OF CARE 3.7 3.4 - 5.1 mmol/L    CHLORIDE - POINT OF CARE 105 98 - 107 mmol/L    TCO2 - POINT OF CARE 29 (H) 19 - 24 mmol/L    ANION GAP - POINT OF CARE 12 10 - 20 mmol/L    HEMATOCRIT - POINT OF CARE 48.0 39.0 - 51.0 %    HEMOGLOBIN - POINT OF CARE 16.3 13.0 - 17.0 g/dL    GLUCOSE - POINT OF CARE 114 (H) 70 - 99 mg/dL    CALCIUM, IONIZED - POINT OF CARE 1.17 1.15 - 1.29 mmol/L    Creatinine 0.90 0.67 - 1.17 mg/dL    Glomerular Filtration Rate >90 >90 mL/min/1.73m2       EKG Results     Time:  0957  Findings: 1) Normal sinus rhythm at 99 bpm 2) No acute ST-T changes 3) Leads V1 T wave inversions    Rhythm strip: 1) NSR 2) No ectopy    Per my review of the EKG no acute abnormalities are noted.  Final interpretation pending cardiology review.      Radiology Results     Imaging Results          CT Angio Head  and Neck Level 1 (In process)  Result time 12/16/20 11:13:11               CT CERVICAL SPINE WO CONTRAST (Final result)  Result time 12/16/20 10:50:49    Final result                 Impression:    IMPRESSION:   1. Acute, nondisplaced skull base fracture involving the left occipital  bone.  2. Possible nondisplaced fracture involving the left occipital condyle.  3. No cervical spine fracture is identified.  4. Moderate degenerative changes of the cervical spine, described above.               Narrative:    EXAM: CT CERVICAL SPINE WO CONTRAST    HISTORY: C-spine trauma, ligamentous injury suspected    COMPARISONS:  None.    TECHNIQUE:  CT of the cervical spine was performed without contrast.  Multiple axial images were obtained from the skull base through the lung  apices. Coronal and sagittal reformatted images were also reviewed.    FINDINGS:      Visualized portions of the skull base reveal a linear fracture line  extending from near midline within the occipital bone through the left  inferior occipital bone. It may traverse the left temporal bone. Subtle  linear lucency is questioned within the left occipital condyle, best seen  on series 6 image 52 and series 10 image 15.    There is relative straightening of the expected cervical lordosis. No  fracture nor subluxation is appreciated.    There are significant degenerative changes seen throughout the cervical  spine. At the level of C1-C2, this includes marginal osteophytes and  subchondral sclerosis along the articulation of the anterior arch of C1 and  the odontoid process. There are also marginal osteophytes along the lateral  mass articulations.    Small marginal osteophytes are present throughout the cervical spine. These  are most prominent from C4 through C7. Associated uncovertebral hypertrophy  from C5 through C7, where there is also mild loss of intervertebral disc  height. Facet hypertrophy is present throughout the cervical spine, most  severe on the  right from C2 through C5.    There is no appreciable cervical spine stenosis. There is significant  osseous neural foraminal narrowing bilaterally at C3-C4 and on the left at  C6-C7.    Prevertebral soft tissues are within normal limits. The airways patent.  Lung apices are clear.    Report was called to Dr. Currie at 10:45 AM on 12/16/2020.                               CT Head Level 1 (Final result)  Result time 12/16/20 10:31:18    Final result                 Impression:    IMPRESSION: Acute  right frontal intraparenchymal hemorrhage with  questioned underlying lesion. Moderate subarachnoid hemorrhage along the  anterior convexities bilateral. Results study were called to Dr. Currie at  1029 12/16/2020.             Narrative:    EXAM: CT HEAD LEVEL 1    DATE OF EXAM: 12/16/2020 10:13 AM.    COMPARISON: None.    CLINICAL HISTORY: Altered mental status (AMS), unclear cause.    FINDINGS: There is a focal 10 x 7 mm parenchymal hemorrhage right frontal  lobe abutting the falx. There is a minimal rim of the decreased attenuation  suggesting edema or possibly an underlying lesion. There is a subarachnoid  hemorrhage along the frontal convexities bilateral.    Ventricular system is normal in size shape and position.    There is normal gray-white matter differentiation. No evidence of diffuse  cerebral edema.       No gross bony abnormality is evident.                                ED Medication Orders (From admission, onward)    Ordered Start     Status Ordering Provider    12/16/20 1100 12/16/20 1200  levETIRAcetam (KepPRA INJECT) 2,500 mg in sodium chloride 0.9 % 250 mL IVPB  ONCE      Acknowledged KAMERON CURRIE    12/16/20 1031 12/16/20 1032  niCARdipine (CARDENE) 40 mg/200 mL in NaCl 0.83 % infusion  CONTINUOUS      Last MAR action: Rate/Dose Change KAMERON CURRIE               MDM  59 yo male who presents to ED secondary to syncope and altered mental status.  DDx includes but not limited to intracranial  hemorrhage, skull fracture, arrythmia.       Case discussed with teleneurologist Dr. Barlow.  Patient NIHSS =0 and finding of right frontal intraparenchymal hemorrhage and subarachnoid hemorrhage on review of CT head without contrast on my personal review.      Case discussed with Dr. Sam, neurointensivist at [facility] who accepts patient for transfer to [facility].    10:52 AM  CT cervical spine without contrast reviewed with radiologist.  Patient has finding of skull based fracture that extends from left occipital bone from midline and inferiorly with occipital condyle fracture.  [facility] tap line notified and transfer to [facility] stopped at this time.    10:54 AM  Cased discussed with [facility] tap line.  Awaiting call from neurointensive care team.      11:16 AM [facility] neurointensivist, Dr. Quiroz accepts transfer to NeuroICU.  Awaiting bed placement.      Clinical Impression     ED Diagnosis   1. Intracranial hemorrhage (CMS/[facility])     2. Subarachnoid hemorrhage (CMS/[facility])     3. Syncope, unspecified syncope type     4. Closed fracture of left side of occipital bone, unspecified occipital fracture type, initial encounter (CMS/HCC)         Disposition        Transfer to Another Facility 12/16/2020 11:22 AM  Eder Bernard should be transferred out to [facility] Neuro ICU      60 minutes of critical care time was spent in care of this patient to treat a possible destabilizing condition-- at the bedside directing care, reviewing historical information, in discussion with staff and consultants, reviewing lab results.  The time does not include any separate critical care procedures.    This was necessary to treat or prevent further deterioration of the following condition(s) intracranial hemorrhage which the patient had and/or had a high probability of suddenly developing.      Maddi Alvarez DO          12:55 PM  Patient is resting comfortably at this time.  Patient does  report some anxiety is noted to have some sinus tachycardia.  Otherwise vitals are stable.  Transfer is delayed secondary to awaiting bed placement at [facility].  They have been contacted multiple times and awaiting transfer at this time.     Maddi Alvarez, DO  12/16/20 1127       Maddi Alvarez, DO  12/16/20 1258     Patient

## 2021-07-16 NOTE — OB RN TRIAGE NOTE - NS_TRIAGEADDITIONAL COMMENTS_OBGYN_ALL_OB_FT
H&P reviewed. The patient was examined and there are no changes to the H&P.        
ht- 5'7.5  wt- 233  repeat bp 146/82

## 2022-05-09 NOTE — OB RN TRIAGE NOTE - NS_OBACUITY_OBGYN_ALL_OB_DT
Kalpana, I just reviewed your stool test results and it is positive for c.diff. I just sent you in a new Rx for an antibiotic called Dificid. This is different than the vancomycin you have used before. It is a pill taken twice daily for 10 days.  I believe it is expensive if not covered by insurance.  I sent in a new prescription but please let me know if is not covered or too costly we can switch back to the vancomycin.  Michelle Ruff MD 
06-Jun-2019 15:30

## 2022-06-14 NOTE — CHART NOTE - NSCHARTNOTEFT_GEN_A_CORE
Pt seen for placement of 7th dose of vaginal cytotec. VE: 3/60/-3  Pt not feeling any ctxs.  On Magnesium sulfate for SPEC.    Vital Signs Last 24 Hrs  T(C): 36.4 (10 Boby 2019 19:30), Max: 36.9 (10 Boby 2019 11:29)  T(F): 97.52 (10 Boby 2019 19:30), Max: 98.42 (10 Boby 2019 11:29)  HR: 78 (11 Jun 2019 02:21) (71 - 105)  BP: 147/83 (11 Jun 2019 02:18) (118/55 - 150/79)  SpO2: 98% (11 Jun 2019 02:21) (88% - 100%)    EFM: 125, mod jeancarlos, +accels, no decels  [place]: no ctxs    P:  -C/w Vaginal cytotec  -C/w Magnesium sulfate  -Monitor Jose Enrique    -SChaka Greenwood, PGY-1 14-Jun-2022 21:07

## 2023-06-16 NOTE — OB RN PATIENT PROFILE - NS_ADMITREASON_OBGYN_ALL_OB
Detail Level: None Stelara Amount: 45 mg Lot # (Optional): [id number] [facility] Expiration Date (Optional): 02/2025 Administered By (Optional): Keegan Champion Consent: The risks of pain and injection site reactions were reviewed with the patient prior to the injection. Include J-Code In Bill: No Hypertensive Disorder J-Code:  Use Enhanced Ndc?: Yes Ndc (45mg Syringe): [address]-94 Ndc (90mg Syringe): [address]-28

## 2023-08-31 ENCOUNTER — NON-APPOINTMENT (OUTPATIENT)
Age: 33
End: 2023-08-31

## 2023-10-24 NOTE — OB RN TRIAGE NOTE - PSH
[facility] Call Center    Phone Message    May a detailed message be left on voicemail: yes     Reason for Call: Other: Pt called, wanting to schedule for : lip edema- was seen by Dr Kianna Bledsoe, [facility],  Writer not seeing dx for this, please review and follow up with pt , thank you     Action Taken: Message routed to:  [facility]     Travel Screening: Not Applicable                                                                    H/O shoulder surgery  2012  R  History of salpingectomy  2008 L ectopic, 2018 R H/O shoulder surgery  2012  R  H/O unilateral salpingectomy  2018 right  History of salpingectomy  2008 L ectopic

## 2024-10-01 NOTE — OB RN PATIENT PROFILE - ATTEMPT TO OOB
A/P:   - pt agreeable to c/s for Cat II tracing remote from delivery   - Dr. Browne at bedside counseling patient     Roselia Reyes PA-C
P0 40w2d presented c/o decreased FM admitted for IOL  Patient only received one dose of buccal cytotec, unable to receive any more cytotec, nor start pitocin because of nonreassuring fetal heart tracing, patient still 1 cm dilated continue to have category II tracing, recommended  to expedite delivery, R/B/A of PLTCS explained to patient in detail, patient verbalized understanding and agreed to procedure.   Ancef and Azithromycin ordered  Anesthesia notified   To the OR when ready      
no

## 2025-03-17 ENCOUNTER — APPOINTMENT (OUTPATIENT)
Dept: OBGYN | Facility: CLINIC | Age: 35
End: 2025-03-17
Payer: COMMERCIAL

## 2025-03-17 VITALS
BODY MASS INDEX: 26.37 KG/M2 | HEIGHT: 68 IN | HEART RATE: 72 BPM | WEIGHT: 174 LBS | SYSTOLIC BLOOD PRESSURE: 111 MMHG | DIASTOLIC BLOOD PRESSURE: 73 MMHG

## 2025-03-17 DIAGNOSIS — Z01.419 ENCOUNTER FOR GYNECOLOGICAL EXAMINATION (GENERAL) (ROUTINE) W/OUT ABNORMAL FINDINGS: ICD-10-CM

## 2025-03-17 PROCEDURE — 99459 PELVIC EXAMINATION: CPT

## 2025-03-17 PROCEDURE — 99385 PREV VISIT NEW AGE 18-39: CPT

## 2025-03-18 LAB
BV BACTERIA RRNA VAG QL NAA+PROBE: DETECTED
C GLABRATA RNA VAG QL NAA+PROBE: NOT DETECTED
C TRACH RRNA SPEC QL NAA+PROBE: NOT DETECTED
CANDIDA RRNA VAG QL PROBE: NOT DETECTED
HBV SURFACE AG SER QL: NONREACTIVE
HCV AB SER QL: NONREACTIVE
HCV S/CO RATIO: 0.26 S/CO
HIV1+2 AB SPEC QL IA.RAPID: NONREACTIVE
HPV HIGH+LOW RISK DNA PNL CVX: NOT DETECTED
N GONORRHOEA RRNA SPEC QL NAA+PROBE: NOT DETECTED
T PALLIDUM AB SER QL IA: NEGATIVE
T VAGINALIS RRNA SPEC QL NAA+PROBE: NOT DETECTED

## 2025-03-20 LAB — CYTOLOGY CVX/VAG DOC THIN PREP: ABNORMAL

## 2025-04-06 PROBLEM — N76.0 ACUTE VAGINITIS: Status: ACTIVE | Noted: 2025-04-06 | Resolved: 2025-04-20
